# Patient Record
Sex: FEMALE | Race: BLACK OR AFRICAN AMERICAN | NOT HISPANIC OR LATINO | Employment: STUDENT
[De-identification: names, ages, dates, MRNs, and addresses within clinical notes are randomized per-mention and may not be internally consistent; named-entity substitution may affect disease eponyms.]

---
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
## 2022-04-26 ENCOUNTER — WALK IN (OUTPATIENT)
Dept: URGENT CARE | Age: 22
End: 2022-04-26
Attending: FAMILY MEDICINE

## 2022-04-26 VITALS
DIASTOLIC BLOOD PRESSURE: 75 MMHG | TEMPERATURE: 98.7 F | RESPIRATION RATE: 18 BRPM | SYSTOLIC BLOOD PRESSURE: 116 MMHG | HEIGHT: 68 IN | HEART RATE: 103 BPM | WEIGHT: 215 LBS | OXYGEN SATURATION: 99 % | BODY MASS INDEX: 32.58 KG/M2

## 2022-04-26 DIAGNOSIS — J02.9 SORE THROAT: Primary | ICD-10-CM

## 2022-04-26 DIAGNOSIS — U07.1 COVID-19 VIRUS INFECTION: ICD-10-CM

## 2022-04-26 LAB
INTERNAL PROCEDURAL CONTROLS ACCEPTABLE: YES
SARS-COV+SARS-COV-2 AG RESP QL IA.RAPID: DETECTED

## 2022-04-26 PROCEDURE — C9803 HOPD COVID-19 SPEC COLLECT: HCPCS

## 2022-04-26 PROCEDURE — 99203 OFFICE O/P NEW LOW 30 MIN: CPT

## 2022-04-26 PROCEDURE — 87426 SARSCOV CORONAVIRUS AG IA: CPT | Performed by: FAMILY MEDICINE

## 2022-04-26 ASSESSMENT — ENCOUNTER SYMPTOMS
SORE THROAT: 1
COUGH: 1

## 2022-04-26 ASSESSMENT — PAIN SCALES - GENERAL: PAINLEVEL: 6

## 2024-02-16 ENCOUNTER — HOSPITAL ENCOUNTER (EMERGENCY)
Age: 24
Discharge: PSYCHIATRIC OR SUBSTANCE USE FACILITY | End: 2024-02-16
Attending: EMERGENCY MEDICINE

## 2024-02-16 VITALS
DIASTOLIC BLOOD PRESSURE: 74 MMHG | HEART RATE: 53 BPM | HEIGHT: 68 IN | BODY MASS INDEX: 32.69 KG/M2 | OXYGEN SATURATION: 98 % | SYSTOLIC BLOOD PRESSURE: 107 MMHG | RESPIRATION RATE: 16 BRPM | TEMPERATURE: 99.1 F

## 2024-02-16 DIAGNOSIS — F32.2 CURRENT SEVERE EPISODE OF MAJOR DEPRESSIVE DISORDER WITHOUT PSYCHOTIC FEATURES, UNSPECIFIED WHETHER RECURRENT (CMD): Primary | ICD-10-CM

## 2024-02-16 PROBLEM — F33.2 MAJOR DEPRESSIVE DISORDER, RECURRENT SEVERE WITHOUT PSYCHOTIC FEATURES (HCC): Status: ACTIVE | Noted: 2024-02-16

## 2024-02-16 LAB
ALBUMIN SERPL-MCNC: 3.6 G/DL (ref 3.6–5.1)
ALBUMIN/GLOB SERPL: 0.8 {RATIO} (ref 1–2.4)
ALP SERPL-CCNC: 51 UNITS/L (ref 45–117)
ALT SERPL-CCNC: 14 UNITS/L
AMPHETAMINES UR QL SCN>500 NG/ML: NEGATIVE
ANION GAP SERPL CALC-SCNC: 8 MMOL/L (ref 7–19)
AST SERPL-CCNC: 16 UNITS/L
B-HCG UR QL: NEGATIVE
BARBITURATES UR QL SCN>200 NG/ML: NEGATIVE
BASOPHILS # BLD: 0 K/MCL (ref 0–0.3)
BASOPHILS NFR BLD: 1 %
BENZODIAZ UR QL SCN>200 NG/ML: NEGATIVE
BILIRUB SERPL-MCNC: 0.7 MG/DL (ref 0.2–1)
BUN SERPL-MCNC: 7 MG/DL (ref 6–20)
BUN/CREAT SERPL: 9 (ref 7–25)
BZE UR QL SCN>150 NG/ML: NEGATIVE
CALCIUM SERPL-MCNC: 9.6 MG/DL (ref 8.4–10.2)
CANNABINOIDS UR QL SCN>50 NG/ML: POSITIVE
CHLORIDE SERPL-SCNC: 105 MMOL/L (ref 97–110)
CO2 SERPL-SCNC: 26 MMOL/L (ref 21–32)
CREAT SERPL-MCNC: 0.76 MG/DL (ref 0.51–0.95)
DEPRECATED RDW RBC: 51.4 FL (ref 39–50)
EGFRCR SERPLBLD CKD-EPI 2021: >90 ML/MIN/{1.73_M2}
EOSINOPHIL # BLD: 0 K/MCL (ref 0–0.5)
EOSINOPHIL NFR BLD: 1 %
ERYTHROCYTE [DISTWIDTH] IN BLOOD: 16.5 % (ref 11–15)
ETHANOL SERPL-MCNC: NORMAL MG/DL
FASTING DURATION TIME PATIENT: ABNORMAL H
FENTANYL UR QL SCN: NEGATIVE
GLOBULIN SER-MCNC: 4.6 G/DL (ref 2–4)
GLUCOSE SERPL-MCNC: 103 MG/DL (ref 70–99)
HCG UR QL: NEGATIVE
HCT VFR BLD CALC: 38.5 % (ref 36–46.5)
HGB BLD-MCNC: 11.8 G/DL (ref 12–15.5)
IMM GRANULOCYTES # BLD AUTO: 0 K/MCL (ref 0–0.2)
IMM GRANULOCYTES # BLD: 0 %
INTERNAL PROCEDURAL CONTROLS ACCEPTABLE: YES
LYMPHOCYTES # BLD: 1.7 K/MCL (ref 1–4.8)
LYMPHOCYTES NFR BLD: 29 %
MCH RBC QN AUTO: 26 PG (ref 26–34)
MCHC RBC AUTO-ENTMCNC: 30.6 G/DL (ref 32–36.5)
MCV RBC AUTO: 84.8 FL (ref 78–100)
MONOCYTES # BLD: 0.5 K/MCL (ref 0.3–0.9)
MONOCYTES NFR BLD: 8 %
NEUTROPHILS # BLD: 3.5 K/MCL (ref 1.8–7.7)
NEUTROPHILS NFR BLD: 61 %
NRBC BLD MANUAL-RTO: 0 /100 WBC
OPIATES UR QL SCN>300 NG/ML: NEGATIVE
PCP UR QL SCN>25 NG/ML: NEGATIVE
PLATELET # BLD AUTO: 335 K/MCL (ref 140–450)
POTASSIUM SERPL-SCNC: 3.1 MMOL/L (ref 3.4–5.1)
PROT SERPL-MCNC: 8.2 G/DL (ref 6.4–8.2)
RBC # BLD: 4.54 MIL/MCL (ref 4–5.2)
SARS-COV-2 RNA RESP QL NAA+PROBE: NOT DETECTED
SERVICE CMNT-IMP: NORMAL
SERVICE CMNT-IMP: NORMAL
SODIUM SERPL-SCNC: 136 MMOL/L (ref 135–145)
WBC # BLD: 5.8 K/MCL (ref 4.2–11)

## 2024-02-16 PROCEDURE — 80053 COMPREHEN METABOLIC PANEL: CPT | Performed by: EMERGENCY MEDICINE

## 2024-02-16 PROCEDURE — 99284 EMERGENCY DEPT VISIT MOD MDM: CPT

## 2024-02-16 PROCEDURE — 82077 ASSAY SPEC XCP UR&BREATH IA: CPT | Performed by: EMERGENCY MEDICINE

## 2024-02-16 PROCEDURE — 84703 CHORIONIC GONADOTROPIN ASSAY: CPT

## 2024-02-16 PROCEDURE — 36415 COLL VENOUS BLD VENIPUNCTURE: CPT

## 2024-02-16 PROCEDURE — 90839 PSYTX CRISIS INITIAL 60 MIN: CPT

## 2024-02-16 PROCEDURE — 81025 URINE PREGNANCY TEST: CPT | Performed by: EMERGENCY MEDICINE

## 2024-02-16 PROCEDURE — 87635 SARS-COV-2 COVID-19 AMP PRB: CPT | Performed by: EMERGENCY MEDICINE

## 2024-02-16 PROCEDURE — 10002803 HB RX 637: Performed by: EMERGENCY MEDICINE

## 2024-02-16 PROCEDURE — 80307 DRUG TEST PRSMV CHEM ANLYZR: CPT | Performed by: EMERGENCY MEDICINE

## 2024-02-16 PROCEDURE — 85025 COMPLETE CBC W/AUTO DIFF WBC: CPT | Performed by: EMERGENCY MEDICINE

## 2024-02-16 RX ORDER — POTASSIUM CHLORIDE 20 MEQ/1
40 TABLET, EXTENDED RELEASE ORAL ONCE
Status: COMPLETED | OUTPATIENT
Start: 2024-02-16 | End: 2024-02-16

## 2024-02-16 RX ORDER — DIAZEPAM 5 MG/1
5 TABLET ORAL ONCE
Status: COMPLETED | OUTPATIENT
Start: 2024-02-16 | End: 2024-02-16

## 2024-02-16 RX ADMIN — POTASSIUM CHLORIDE 40 MEQ: 1500 TABLET, EXTENDED RELEASE ORAL at 19:01

## 2024-02-16 RX ADMIN — DIAZEPAM 5 MG: 5 TABLET ORAL at 16:50

## 2024-02-16 SDOH — ECONOMIC STABILITY: HOUSING INSECURITY: WHAT IS YOUR LIVING SITUATION TODAY?: I HAVE A STEADY PLACE TO LIVE

## 2024-02-16 SDOH — SOCIAL STABILITY: SOCIAL NETWORK
HOW OFTEN DO YOU SEE OR TALK TO PEOPLE THAT YOU CARE ABOUT AND FEEL CLOSE TO? (FOR EXAMPLE: TALKING TO FRIENDS ON THE PHONE, VISITING FRIENDS OR FAMILY, GOING TO CHURCH OR CLUB MEETINGS): 5 OR MORE TIMES A WEEK

## 2024-02-16 SDOH — ECONOMIC STABILITY: HOUSING INSECURITY: DO YOU HAVE PROBLEMS WITH ANY OF THE FOLLOWING?: NONE OF THE ABOVE

## 2024-02-16 SDOH — ECONOMIC STABILITY: FOOD INSECURITY: WITHIN THE PAST 12 MONTHS, THE FOOD YOU BOUGHT JUST DIDN'T LAST AND YOU DIDN'T HAVE MONEY TO GET MORE.: NEVER TRUE

## 2024-02-16 SDOH — ECONOMIC STABILITY: GENERAL

## 2024-02-16 SDOH — ECONOMIC STABILITY: TRANSPORTATION INSECURITY
IN THE PAST 12 MONTHS, HAS LACK OF RELIABLE TRANSPORTATION KEPT YOU FROM MEDICAL APPOINTMENTS, MEETINGS, WORK OR FROM GETTING THINGS NEEDED FOR DAILY LIVING?: NO

## 2024-02-16 ASSESSMENT — LIFESTYLE VARIABLES
HOW OFTEN DO YOU HAVE 6 OR MORE DRINKS ON ONE OCCASION: NEVER
HOW OFTEN DO YOU HAVE A DRINK CONTAINING ALCOHOL: NEVER
HOW MANY STANDARD DRINKS CONTAINING ALCOHOL DO YOU HAVE ON A TYPICAL DAY: 0,1 OR 2
ALCOHOL_USE_STATUS: NO OR LOW RISK WITH VALIDATED TOOL
ALCOHOL_USE: DENIES
AUDIT-C TOTAL SCORE: 0

## 2024-02-16 ASSESSMENT — COGNITIVE AND FUNCTIONAL STATUS - GENERAL
BEHAVIOR: APPROPRIATE TO SITUATION;CRYING
MEMORY: INTACT
MOTOR_BEHAVIOR-RETARDATION_CALCULATED: CALM AND PURPOSEFUL
ATTENTION_CALCULATED: MAINTAINS ATTENTION
AFFECT: APPROPRIATE TO SITUATION;SAD
SPEECH: CLEAR/UNDERSTANDABLE
LEVEL_OF_CONSCIOUSNESS_CALCULATED: ALERT
PERCEPTUAL_MISINTERPRETATIONS_HALLUCINATIONS: CLEAR REALITY BASED PERCEPTIONS
MOTOR_BEHAVIOR-AGITATION_CALCULATED: CALM AND PURPOSEFUL
MOOD: ANXIOUS;DEPRESSED
ORIENTATION: ORIENTED TO PERSON;ORIENTED TO PLACE;ORIENTED TO TIME

## 2024-02-16 ASSESSMENT — PAIN SCALES - GENERAL: PAINLEVEL_OUTOF10: 0

## 2024-03-25 PROBLEM — F43.10 PTSD (POST-TRAUMATIC STRESS DISORDER): Status: ACTIVE | Noted: 2024-03-25

## 2024-03-25 PROBLEM — F12.90 CANNABIS USE DISORDER: Status: ACTIVE | Noted: 2024-03-25

## 2024-03-25 PROBLEM — F41.1 GAD (GENERALIZED ANXIETY DISORDER): Status: ACTIVE | Noted: 2024-03-25

## 2024-05-30 ENCOUNTER — NURSE TRIAGE (OUTPATIENT)
Dept: FAMILY MEDICINE CLINIC | Facility: CLINIC | Age: 24
End: 2024-05-30

## 2024-05-31 ENCOUNTER — TELEMEDICINE (OUTPATIENT)
Dept: FAMILY MEDICINE CLINIC | Facility: CLINIC | Age: 24
End: 2024-05-31

## 2024-05-31 DIAGNOSIS — Z70.8 COUNSELING FOR SEXUALLY TRANSMITTED DISEASE: ICD-10-CM

## 2024-05-31 DIAGNOSIS — N89.8 VAGINAL DISCHARGE: ICD-10-CM

## 2024-05-31 DIAGNOSIS — Z20.2 EXPOSURE TO CHLAMYDIA: ICD-10-CM

## 2024-05-31 RX ORDER — DOXYCYCLINE HYCLATE 100 MG
100 TABLET ORAL 2 TIMES DAILY
Qty: 14 TABLET | Refills: 0 | Status: SHIPPED | OUTPATIENT
Start: 2024-05-31 | End: 2024-06-07

## 2024-05-31 NOTE — PROGRESS NOTES
This is a telemedicine visit with live, interactive video and audio.     Patient understands and accepts financial responsibility for any deductible, co-insurance and/or co-pays associated with this service.    SUBJECTIVE    24-year-old female calling to discuss exposure to chlamydia.  States that when her and her partner started dating, he got tested and was positive for chlamydia which prompted her to get tested at that time and was positive as well.  They were both treated with doxycycline however did not complete the course of antibiotics and missed a couple of days.  In general both felt well afterwards.    Her boyfriend went for an office visit around 1.5 weeks ago and had an STD panel that came back positive for chlamydia.  Patient states gonorrhea was negative however will double check with boyfriend.  For the past week patient has been noticing vaginal discharge with a foul odor.  Denies fever, chills.      HISTORY:  No past medical history on file.   No past surgical history on file.   Family History   Problem Relation Age of Onset    Bipolar Disorder Son       Social History     Socioeconomic History    Marital status: Single   Tobacco Use    Smoking status: Never    Smokeless tobacco: Never   Vaping Use    Vaping status: Every Day    Substances: Nicotine, THC    Devices: Disposable   Substance and Sexual Activity    Alcohol use: Yes     Alcohol/week: 2.0 standard drinks of alcohol     Types: 2 Standard drinks or equivalent per week     Comment: social drinker. Last drink was 2- had 2 mixed drinks/    Drug use: Yes     Types: Cannabis     Social Determinants of Health     Financial Resource Strain: Low Risk  (2/16/2024)    [facility]    Financial Resource Strain     In the past year, have you or any family members you live with been unable to get any of the following when it was really needed? Check all that apply.: None   Food Insecurity: Low Risk   (2/16/2024)    [facility], [facility]    Food Insecurity     Within the past 12 months, you worried that your food would run out before you got money to buy more.  : Never true     Within the past 12 months, the food you bought just didn't last and you didn't have money to get more. : Never true   Transportation Needs: Not At Risk (2/16/2024)    [facility], [facility]    Transportation Needs     In the past 12 months, has lack of reliable transportation kept you from medical appointments, meetings, work or from getting things needed for daily living? : No   Social Connections: Low Risk  (2/16/2024)    Received from [facility], [facility]    Social Connections     How often do you see or talk to people that you care about and feel close to? (For example: talking to friends on the phone, visiting friends or family, going to [employer] or club meetings): 5 or more times a week        No Known Allergies   Current Outpatient Medications   Medication Sig Dispense Refill    Doxycycline Hyclate 100 MG Oral Tab Take 1 tablet (100 mg total) by mouth 2 (two) times daily for 7 days. 14 tablet 0    traZODone 100 MG Oral Tab Take 1 tablet (100 mg total) by mouth nightly as needed for Sleep (sleep/insomnia). 30 tablet 0    FLUoxetine 20 MG Oral Cap Take 1 capsule (20 mg total) by mouth daily. 30 capsule 0    hydrOXYzine 25 MG Oral Tab Take 1 tablet (25 mg total) by mouth daily as needed for Anxiety. 30 tablet 0       OBJECTIVE  Physical Exam:   alert, appears stated age, cooperative, and no distress, Speaking in full sentences comfortably, and Normal work of breathing    ASSESSMENT & PLAN    1. Exposure to chlamydia  Patient denies any chance of pregnancy.  -Advised patient to verify that boyfriend's gonorrhea test is negative.  -Advised about screening for other STDs  -Counseled that current symptoms may be in relation to other  vaginosis/vaginitis symptoms  -     Doxycycline Hyclate; Take 1 tablet (100 mg total) by mouth 2 (two) times daily for 7 days.  Dispense: 14 tablet; Refill: 0  2. Vaginal discharge  -Counseled that current symptoms may be in relation to other vaginosis/vaginitis symptoms such as BV etc.  -If symptoms continue, to follow-up next week for vaginitis/vaginosis swab.  Also due for Pap smear.  3. Counseling for sexually transmitted disease        Return in about 1 week (around 6/7/2024), or if symptoms worsen or fail to improve.    Benjamin Light MD

## 2024-05-31 NOTE — TELEPHONE ENCOUNTER
RN spoke with patient.     Patient is having odor, discharge, and vaginal itching. Symptoms have been on and off for a while.     Patient recently exposed to chlamydia. Patient states boyfriend and her were treated in the past for infection. Patient says that they both missed a few days of medication for previous infection.       Patient's partner tested positive a week and a half ago.       Patient has history of genital sores. Denies new sores.     Patient scheduled to discuss with Dr. Light today.         Reason for Disposition   Patient is worried about a sexually transmitted disease (STD)    Protocols used: STD Exposure and Prevention-A-[place]

## 2024-08-14 ENCOUNTER — TELEPHONE (OUTPATIENT)
Dept: FAMILY MEDICINE CLINIC | Facility: CLINIC | Age: 24
End: 2024-08-14

## 2024-08-14 ENCOUNTER — APPOINTMENT (OUTPATIENT)
Dept: GENERAL RADIOLOGY | Age: 24
End: 2024-08-14
Attending: NURSE PRACTITIONER
Payer: COMMERCIAL

## 2024-08-14 ENCOUNTER — HOSPITAL ENCOUNTER (OUTPATIENT)
Age: 24
Discharge: HOME | End: 2024-08-14
Payer: COMMERCIAL

## 2024-08-14 VITALS
RESPIRATION RATE: 18 BRPM | HEART RATE: 95 BPM | SYSTOLIC BLOOD PRESSURE: 126 MMHG | OXYGEN SATURATION: 100 % | TEMPERATURE: 97 F | DIASTOLIC BLOOD PRESSURE: 83 MMHG

## 2024-08-14 DIAGNOSIS — E87.6 HYPOKALEMIA: ICD-10-CM

## 2024-08-14 DIAGNOSIS — R42 DIZZINESS: Primary | ICD-10-CM

## 2024-08-14 DIAGNOSIS — B34.9 VIRAL ILLNESS: ICD-10-CM

## 2024-08-14 LAB
#MXD IC: 0.6 X10ˆ3/UL (ref 0.1–1)
#MXD IC: 0.7 X10ˆ3/UL (ref 0.1–1)
ATRIAL RATE: 88 BPM
BUN BLD-MCNC: 5 MG/DL (ref 7–18)
BUN BLD-MCNC: 6 MG/DL (ref 7–18)
CHLORIDE BLD-SCNC: 100 MMOL/L (ref 98–112)
CHLORIDE BLD-SCNC: 99 MMOL/L (ref 98–112)
CO2 BLD-SCNC: 26 MMOL/L (ref 21–32)
CO2 BLD-SCNC: 26 MMOL/L (ref 21–32)
CREAT BLD-MCNC: 0.7 MG/DL
CREAT BLD-MCNC: 0.7 MG/DL
EGFRCR SERPLBLD CKD-EPI 2021: 124 ML/MIN/1.73M2 (ref 60–?)
EGFRCR SERPLBLD CKD-EPI 2021: 124 ML/MIN/1.73M2 (ref 60–?)
GLUCOSE BLD-MCNC: 100 MG/DL (ref 70–99)
GLUCOSE BLD-MCNC: 100 MG/DL (ref 70–99)
HCT VFR BLD AUTO: 37.4 %
HCT VFR BLD AUTO: 37.6 %
HCT VFR BLD CALC: 40 %
HCT VFR BLD CALC: 40 %
HGB BLD-MCNC: 11.4 G/DL
HGB BLD-MCNC: 11.4 G/DL
ISTAT IONIZED CALCIUM FOR CHEM 8: 1.1 MMOL/L (ref 1.12–1.32)
ISTAT IONIZED CALCIUM FOR CHEM 8: 1.15 MMOL/L (ref 1.12–1.32)
LYMPHOCYTES # BLD AUTO: 3 X10ˆ3/UL (ref 1–4)
LYMPHOCYTES # BLD AUTO: 3.1 X10ˆ3/UL (ref 1–4)
LYMPHOCYTES NFR BLD AUTO: 66.4 %
LYMPHOCYTES NFR BLD AUTO: 67.7 %
MCH RBC QN AUTO: 26.1 PG (ref 26–34)
MCH RBC QN AUTO: 26.3 PG (ref 26–34)
MCHC RBC AUTO-ENTMCNC: 30.3 G/DL (ref 31–37)
MCHC RBC AUTO-ENTMCNC: 30.5 G/DL (ref 31–37)
MCV RBC AUTO: 85.8 FL (ref 80–100)
MCV RBC AUTO: 86.6 FL (ref 80–100)
MIXED CELL %: 12.6 %
MIXED CELL %: 15.7 %
NEUTROPHILS # BLD AUTO: 0.8 X10ˆ3/UL (ref 1.5–7.7)
NEUTROPHILS # BLD AUTO: 0.9 X10ˆ3/UL (ref 1.5–7.7)
NEUTROPHILS NFR BLD AUTO: 17.9 %
NEUTROPHILS NFR BLD AUTO: 19.7 %
P AXIS: 52 DEGREES
P-R INTERVAL: 148 MS
PLATELET # BLD AUTO: 211 X10ˆ3/UL (ref 150–450)
PLATELET # BLD AUTO: 215 X10ˆ3/UL (ref 150–450)
POCT MONO: NEGATIVE
POTASSIUM BLD-SCNC: 2.9 MMOL/L (ref 3.6–5.1)
POTASSIUM BLD-SCNC: 3.2 MMOL/L (ref 3.6–5.1)
Q-T INTERVAL: 364 MS
QRS DURATION: 80 MS
QTC CALCULATION (BEZET): 440 MS
R AXIS: 47 DEGREES
RBC # BLD AUTO: 4.34 X10ˆ6/UL
RBC # BLD AUTO: 4.36 X10ˆ6/UL
S PYO AG THROAT QL: NEGATIVE
SODIUM BLD-SCNC: 138 MMOL/L (ref 136–145)
SODIUM BLD-SCNC: 138 MMOL/L (ref 136–145)
T AXIS: 21 DEGREES
TROPONIN I BLD-MCNC: <0.02 NG/ML
VENTRICULAR RATE: 88 BPM
WBC # BLD AUTO: 4.5 X10ˆ3/UL (ref 4–11)
WBC # BLD AUTO: 4.6 X10ˆ3/UL (ref 4–11)

## 2024-08-14 PROCEDURE — 84484 ASSAY OF TROPONIN QUANT: CPT | Performed by: NURSE PRACTITIONER

## 2024-08-14 PROCEDURE — 86308 HETEROPHILE ANTIBODY SCREEN: CPT | Performed by: NURSE PRACTITIONER

## 2024-08-14 PROCEDURE — 80047 BASIC METABLC PNL IONIZED CA: CPT | Performed by: NURSE PRACTITIONER

## 2024-08-14 PROCEDURE — 87880 STREP A ASSAY W/OPTIC: CPT | Performed by: NURSE PRACTITIONER

## 2024-08-14 PROCEDURE — 85025 COMPLETE CBC W/AUTO DIFF WBC: CPT | Performed by: NURSE PRACTITIONER

## 2024-08-14 PROCEDURE — 71046 X-RAY EXAM CHEST 2 VIEWS: CPT | Performed by: NURSE PRACTITIONER

## 2024-08-14 PROCEDURE — 99214 OFFICE O/P EST MOD 30 MIN: CPT | Performed by: NURSE PRACTITIONER

## 2024-08-14 PROCEDURE — 93000 ELECTROCARDIOGRAM COMPLETE: CPT | Performed by: NURSE PRACTITIONER

## 2024-08-14 RX ORDER — POTASSIUM CHLORIDE 1500 MG/1
40 TABLET, EXTENDED RELEASE ORAL DAILY
Qty: 20 TABLET | Refills: 0 | Status: SHIPPED | OUTPATIENT
Start: 2024-08-14 | End: 2024-08-24

## 2024-08-14 RX ORDER — POTASSIUM CHLORIDE 20 MEQ/1
40 TABLET, EXTENDED RELEASE ORAL ONCE
Status: COMPLETED | OUTPATIENT
Start: 2024-08-14 | End: 2024-08-14

## 2024-08-14 NOTE — DISCHARGE INSTRUCTIONS
As discussed, potassium low in immediate care.  3.2.  I have prescribed potassium for you to take daily for the next 10 days.  Please follow-up with your primary care doctor promptly for reevaluation of potassium level.  I will also reach out to him to let him know of your visit to immediate care clinic today.    You are negative for strep.  You are negative for mono.    Your chest x-ray is negative for any acute disease in the chest.  EKG within normal limits.    CBC relatively within normal limits.    Please drink plenty of water and stay well-hydrated.  Change positions slowly.    If you have any chest pain, dizziness, [place] palpitations, shortness of breath, please go to ER.

## 2024-08-14 NOTE — TELEPHONE ENCOUNTER
RN spoke with patient.     Patient sent iSquare message with several symptoms.     Symptoms started approx 3 weeks ago.     Patient complains of \"extreme fatigue\" patient also complains of \"swollen bumps\". Patient feels \"weird spots in her neck\"    Patient has had episodes of dizziness. Was at work on Thursday and felt like \"her brain was being zapped for a minute\" and she vomiting after that\"     Patient had stomach pain last week. Mostly on the left side.    Today patient is feeling sore, itchy, and sweaty.     Patient denies chest pain or shortness of breath at this time. Patient says that \"her collar bone is sore\"     Patient advised to proceed to immediate care for assessment and evaluation of symptoms.     Patient voiced understanding and agreement with the plan.

## 2024-08-14 NOTE — ED PROVIDER NOTES
Patient Seen in: [facility]      History     Chief Complaint   Patient presents with    Fatigue     Stated Complaint: Sore Throat    Subjective: This is a 24-year-old female, past medical history of anxiety/depression and current vape use, presents to immediate care with multiple medical complaints that been ongoing for the past 2 to 4 weeks.  Patient reports subjective fever, chills, body aches, headache.  She also reports sore throat and lymph node swelling.  Reports emesis and diarrhea on Friday.  Does describe biliary emesis without blood.  She also reports these intermittent spells of dizziness where she almost passes out.  Denies aggravating or alleviating factors.  Sent home from work recently due to dizzy spell.  She took several COVID-19 test prior to arrival which were negative.  Patient afebrile on arrival.  No current cough or congestion, however positive sore throat and lymph node swelling.  Positive chest wall tenderness and tightness without chest pain.  No current dizziness, lightheadedness, palpitations, shortness of breath.  Denies recent travel.  No prolonged immobilization.  She is not on birth control.  No personal or family history of DVT/PE.  No family cardiac history of cardiac event before the age of 50.  Patient is well-appearing.  Speaking in complete full sentences without difficulty.  AOx4.  HPI        Objective:   History reviewed. No pertinent past medical history.           History reviewed. No pertinent surgical history.             Social History     Socioeconomic History    Marital status: Single   Tobacco Use    Smoking status: Never    Smokeless tobacco: Never   Vaping Use    Vaping status: Every Day    Substances: Nicotine, THC    Devices: Disposable   Substance and Sexual Activity    Alcohol use: Yes     Alcohol/week: 2.0 standard drinks of alcohol     Types: 2 Standard drinks or equivalent per week     Comment: social drinker. Last drink was 2- had 2  mixed drinks/    Drug use: Yes     Types: Cannabis     Social Determinants of Health     Financial Resource Strain: Low Risk  (2/16/2024)    [facility], [facility]    Financial Resource Strain     In the past year, have you or any family members you live with been unable to get any of the following when it was really needed? Check all that apply.: None   Food Insecurity: Low Risk  (2/16/2024)    [facility], [facility]    Food Insecurity     Within the past 12 months, you worried that your food would run out before you got money to buy more.  : Never true     Within the past 12 months, the food you bought just didn't last and you didn't have money to get more. : Never true   Transportation Needs: Not At Risk (2/16/2024)    [facility], [facility]    Transportation Needs     In the past 12 months, has lack of reliable transportation kept you from medical appointments, meetings, work or from getting things needed for daily living? : No   Social Connections: Low Risk  (2/16/2024)    [facility], [facility]    Social Connections     How often do you see or talk to people that you care about and feel close to? (For example: talking to friends on the phone, visiting friends or family, going to [employer] or club meetings): 5 or more times a week              Review of Systems    Positive for stated Chief Complaint: Fatigue    Other systems are as noted in HPI.  Constitutional and vital signs reviewed.      All other systems reviewed and negative except as noted above.    Physical Exam     ED Triage Vitals [08/14/24 1356]   /83   Pulse 95   Resp 18   Temp 97.2 °F (36.2 °C)   Temp src Temporal   SpO2 100 %   O2 Device None (Room air)       Current Vitals:   Vital Signs  BP: 126/83  Pulse: 95  Resp: 18  Temp: 97.2 °F (36.2 °C)  Temp src: Temporal    Oxygen Therapy  SpO2: 100  %  O2 Device: None (Room air)            Physical Exam          ED Course     Labs Reviewed   POCT CBC - Abnormal; Notable for the following components:       Result Value    HGB IC 11.4 (*)     MCHC IC 30.5 (*)     # Neutrophil 0.9 (*)     All other components within normal limits   POCT CBC - Abnormal; Notable for the following components:    HGB IC 11.4 (*)     MCHC IC 30.3 (*)     # Neutrophil 0.8 (*)     All other components within normal limits   POCT ISTAT CHEM8 CARTRIDGE - Abnormal; Notable for the following components:    ISTAT BUN 5 (*)     ISTAT Potassium 2.9 (*)     ISTAT Ionized Calcium 1.10 (*)     ISTAT Glucose 100 (*)     All other components within normal limits   POCT ISTAT CHEM8 CARTRIDGE - Abnormal; Notable for the following components:    ISTAT BUN 6 (*)     ISTAT Potassium 3.2 (*)     ISTAT Glucose 100 (*)     All other components within normal limits   POCT MONO TEST - Normal   POCT RAPID STREP - Normal   ISTAT TROPONIN - Normal     EKG    Rate, intervals and axes as noted on EKG Report.  Rate:   Rhythm: Sinus Rhythm  Reading: Normal sinus rhythm.  No ST elevation.  Tall peaked QRS, narrow complex.  No previous EKGs for comparison.            XR CHEST PA + LAT CHEST (CPT=71046)    Result Date: 8/14/2024  CONCLUSION: No radiographic evidence of acute cardiopulmonary abnormality.    elm-remote    Dictated by (CST): Tevin Barajas MD on 8/14/2024 at 3:11 PM     Finalized by (CST): Tevin Barajas MD on 8/14/2024 at 3:12 PM                       MDM      Differentials considered include: Anemia, pneumonia, strep pharyngitis, mononucleosis, metabolic abnormality, arrhythmia.     Patient has had symptoms for greater than 3 weeks.  Dizziness without aggravating relieving factors.  No headache, vision changes, nausea, vomiting with dizziness.  However, did have episode of nausea, vomiting, diarrhea on Friday which is since resolved.    Patient tested her cell for COVID-19 several times and was negative.   Due to length and duration of symptoms, I do not believe that any retesting is warranted.    Patient negative for strep pharyngitis.  Airway patent.  Uvula midline and normal size.  Mucous membranes moist.  No intraoral lesions or petechiae.  Mild cervical lymphadenopathy.  Patient tolerating his secretions well without difficulty.    Patient reports left upper quadrant feeling \"bouncy\".  There is no hepatomegaly or splenomegaly on exam.  Nontender on exam.  Patient negative for infectious mononucleosis.    CBC with mildly decreased hemoglobin.  No leukocytosis.    Patient's chemistry with hypokalemia of 2.9 at first, redrawn for affirmation, 3.2 at second redraw.  Patient does verbalize during a recent mental health crisis she was told she had hypokalemia and was given potassium.  However, she cannot recall level, unable to access chart as this was at a different hospital/organization.  Patient cannot access her MyChart either.  Her previous potassium was normal at 3.5 in February of this year.  She does report dizziness which would coincide with hypokalemia as well as fatigue.  Positive abdominal cramping with nausea, vomiting.  Denies any muscle cramps or spasms.    She is not on any potassium depleting medication.  She was given one-time dose of 40 mEq in immediate care, will discharge home with 10-day supply.  Would like patient to follow-up promptly with PCP for reevaluation.    Chest x-ray negative, no acute disease seen in the chest on independent interpretation of chest x-ray.  No pneumonia or bronchitis.  Patient was without cough, congestion, runny nose.    EKG within normal limits.  Normal sinus rhythm.  No previous EKG for comparison.  There is no prolonged QT on EKG.  No ST depression.  Unclear cause for patient's hypokalemia.    Did discuss all findings with patient.  Unclear cause of symptoms.    She also reports full body itching without known causative agent and organism.  She denies any new soaps,  lotions, detergents etc.  Will send her to dermatology.  Encourage patient to keep a log to see if there are any, denominator's when she feels whole body itching.  No angioedema.    Did discuss with supervising physician, Dr. Vargas who agrees with workup, plan of care, strict PCP follow-up with strict ER precautions.    This provider will reach out to patient's primary care doctor to inform him of her visit to immediate care and abnormal lab values.                                   Medical Decision Making  Amount and/or Complexity of Data Reviewed  Labs: ordered.  Radiology: ordered and independent interpretation performed.  ECG/medicine tests: ordered and independent interpretation performed.        Disposition and Plan     Clinical Impression:  1. Dizziness    2. Hypokalemia    3. Viral illness         Disposition:  Discharge  8/14/2024  3:50 pm    Follow-up:  Benjamin Light MD  [address] [facility]  [address]  [place] [address]  [phone]    Schedule an appointment as soon as possible for a visit in 7 days      [facility] [place] [address]  [facility] [id number] [phone]  Schedule an appointment as soon as possible for a visit             Medications Prescribed:  Discharge Medication List as of 8/14/2024  3:57 PM        START taking these medications    Details   Potassium Chloride ER 20 MEQ Oral Tab CR Take 40 mEq by mouth daily for 10 days., Normal, Disp-20 tablet, R-0

## 2024-08-14 NOTE — ED INITIAL ASSESSMENT (HPI)
Last few weeks, intermittent chills, bodyaches, dizziness, emesis x1 on Friday, fatigue. Patient reports being neg for covid on Thursday and Saturday. Intermittent itchiness, swollen neck lymph nodes. Recently started prescription of vitamin D last Thursday.

## 2024-08-16 ENCOUNTER — TELEPHONE (OUTPATIENT)
Dept: FAMILY MEDICINE CLINIC | Facility: CLINIC | Age: 24
End: 2024-08-16

## 2024-08-16 NOTE — TELEPHONE ENCOUNTER
Agree with evaluation in-person as scheduled. Please recommend that she start potassium right away as her levels were low in the IC. Thanks.

## 2024-08-16 NOTE — TELEPHONE ENCOUNTER
RN spoke with patient.       Patient says she went to immediate care for evaluation of symptoms.     Patient says she has concern for lupus, lymphoma, or HIV.     Patient says she had a cousin with lupus. Patient unsure of HIV exposure, says her partner was \"hypersexual\" but testing was negative.     Patient says she is having decreased appetite, muscle twitching, and early fullness when eating.     Pt was dx with low K+ in [facility]- was given outpatient rx for supplementation. Has not picked up rx yet.     Advised patient to proceed to ED for evaluation of symptoms due to previous low potassium.     Patient scheduled for follow up on 8/20/24 with Dr. Light to discuss all symptoms.

## 2024-08-20 ENCOUNTER — OFFICE VISIT (OUTPATIENT)
Dept: FAMILY MEDICINE CLINIC | Facility: CLINIC | Age: 24
End: 2024-08-20
Payer: COMMERCIAL

## 2024-08-20 VITALS
WEIGHT: 205.19 LBS | TEMPERATURE: 98 F | HEIGHT: 68 IN | HEART RATE: 112 BPM | SYSTOLIC BLOOD PRESSURE: 110 MMHG | BODY MASS INDEX: 31.1 KG/M2 | DIASTOLIC BLOOD PRESSURE: 80 MMHG | OXYGEN SATURATION: 100 %

## 2024-08-20 DIAGNOSIS — Z86.19 HISTORY OF CHLAMYDIA: ICD-10-CM

## 2024-08-20 DIAGNOSIS — Z11.3 SCREENING FOR STD (SEXUALLY TRANSMITTED DISEASE): ICD-10-CM

## 2024-08-20 DIAGNOSIS — E66.9 OBESITY, CLASS I, BMI 30-34.9: ICD-10-CM

## 2024-08-20 DIAGNOSIS — E87.6 HYPOKALEMIA: Primary | ICD-10-CM

## 2024-08-20 DIAGNOSIS — R68.81 EARLY SATIETY: ICD-10-CM

## 2024-08-20 DIAGNOSIS — R11.2 NAUSEA AND VOMITING, UNSPECIFIED VOMITING TYPE: ICD-10-CM

## 2024-08-20 DIAGNOSIS — R53.83 OTHER FATIGUE: ICD-10-CM

## 2024-08-20 DIAGNOSIS — R42 DIZZINESS: ICD-10-CM

## 2024-08-20 DIAGNOSIS — D64.9 MILD ANEMIA: ICD-10-CM

## 2024-08-20 LAB
APPEARANCE: CLEAR
BILIRUBIN: NEGATIVE
CONTROL LINE PRESENT WITH A CLEAR BACKGROUND (YES/NO): YES YES/NO
GLUCOSE (URINE DIPSTICK): NEGATIVE MG/DL
KIT LOT #: NORMAL NUMERIC
LEUKOCYTES: NEGATIVE
MULTISTIX LOT#: NORMAL NUMERIC
NITRITE, URINE: NEGATIVE
OCCULT BLOOD: NEGATIVE
PH, URINE: 5.5 (ref 4.5–8)
PREGNANCY TEST, URINE: NEGATIVE
SPECIFIC GRAVITY: 1.03 (ref 1–1.03)
URINE-COLOR: YELLOW
UROBILINOGEN,SEMI-QN: 0.2 MG/DL (ref 0–1.9)

## 2024-08-20 PROCEDURE — 87491 CHLMYD TRACH DNA AMP PROBE: CPT | Performed by: STUDENT IN AN ORGANIZED HEALTH CARE EDUCATION/TRAINING PROGRAM

## 2024-08-20 PROCEDURE — 99215 OFFICE O/P EST HI 40 MIN: CPT | Performed by: STUDENT IN AN ORGANIZED HEALTH CARE EDUCATION/TRAINING PROGRAM

## 2024-08-20 PROCEDURE — 3079F DIAST BP 80-89 MM HG: CPT | Performed by: STUDENT IN AN ORGANIZED HEALTH CARE EDUCATION/TRAINING PROGRAM

## 2024-08-20 PROCEDURE — 81003 URINALYSIS AUTO W/O SCOPE: CPT | Performed by: STUDENT IN AN ORGANIZED HEALTH CARE EDUCATION/TRAINING PROGRAM

## 2024-08-20 PROCEDURE — 87086 URINE CULTURE/COLONY COUNT: CPT | Performed by: STUDENT IN AN ORGANIZED HEALTH CARE EDUCATION/TRAINING PROGRAM

## 2024-08-20 PROCEDURE — 87591 N.GONORRHOEAE DNA AMP PROB: CPT | Performed by: STUDENT IN AN ORGANIZED HEALTH CARE EDUCATION/TRAINING PROGRAM

## 2024-08-20 PROCEDURE — 3074F SYST BP LT 130 MM HG: CPT | Performed by: STUDENT IN AN ORGANIZED HEALTH CARE EDUCATION/TRAINING PROGRAM

## 2024-08-20 PROCEDURE — 3008F BODY MASS INDEX DOCD: CPT | Performed by: STUDENT IN AN ORGANIZED HEALTH CARE EDUCATION/TRAINING PROGRAM

## 2024-08-20 PROCEDURE — 81025 URINE PREGNANCY TEST: CPT | Performed by: STUDENT IN AN ORGANIZED HEALTH CARE EDUCATION/TRAINING PROGRAM

## 2024-08-20 RX ORDER — HYDROXYZINE HYDROCHLORIDE 10 MG/1
TABLET, FILM COATED ORAL
COMMUNITY
Start: 2024-04-17

## 2024-08-20 NOTE — PROGRESS NOTES
Subjective:   Raheem LudwigMirianEarlene is a 24 year old female who presents for Urgent Care F/u     24-year-old female coming in for follow-up after immediate care visit on 8/14/2024.  Mentions that 2 weeks ago had subjective fever, chills and bodyaches as well as sensation of swollen lymph nodes in her neck.  Tested for COVID and was negative.  On 8/7/2020 -8/9/2024 had 3 episodes of vomiting and mentions third one being bilious.  Also mentioned intermittent episodes of sensation of dizziness.  She is able to hydrate well however mentions not eating as she normally does and feeling satiety after mild eating.  Occasionally gets right and left abdominal midaxillary line discomfort that would last a few seconds.  LMP on 8/12/2024.  During immediate care visit was noted to be hypokalemic and was given supplementation that she started on Friday.  Continues to feel mild fatigue at this time.  At this time denies fever, chills, dysuria, hematuria.    When questioned about history of STDs, mentions having chlamydia last year that was treated.  Mentions having Pap smear 1 year ago in [place] (states was WNL).  She is unsure about HPV vaccination however will check records.    History/Other:    Chief Complaint Reviewed and Verified  No Further Nursing Notes to   Review  Tobacco Reviewed  Allergies Reviewed  Medical History Reviewed    Surgical History Reviewed  Family History Reviewed  Social History   Reviewed         Tobacco:  Social History     Tobacco Use   Smoking Status Never   Smokeless Tobacco Never     E-Cigarettes/Vaping       Questions Responses    E-Cigarette Use Current Every Day User    Counseling Given No    Cartridges/Day every day - nicotine and THC  2 puffs every day to every other day.  Last time used was 2-          E-Cigarette/Vaping Substances       Questions Responses    Nicotine Yes    THC Yes    CBD No          E-Cigarette/Vaping Devices       Questions Responses    Disposable  Yes           Tobacco cessation counseling for <3 minutes.      Current Outpatient Medications   Medication Sig Dispense Refill    hydrOXYzine 10 MG Oral Tab       Potassium Chloride ER 20 MEQ Oral Tab CR Take 40 mEq by mouth daily for 10 days. 20 tablet 0    traZODone 100 MG Oral Tab Take 1 tablet (100 mg total) by mouth nightly as needed for Sleep (sleep/insomnia). 30 tablet 0         Review of Systems:  Review of Systems   Constitutional:  Negative for chills, diaphoresis and fever.   HENT:  Negative for congestion, ear discharge, ear pain, sinus pressure, sinus pain and sore throat.    Eyes:  Negative for pain and discharge.   Respiratory:  Negative for cough, chest tightness, shortness of breath and wheezing.    Cardiovascular:  Negative for chest pain and palpitations.   Gastrointestinal:  Positive for vomiting (resolved at this time.). Negative for abdominal pain, diarrhea and nausea.        Satiety with mild eating.   Endocrine: Negative for cold intolerance and heat intolerance.   Genitourinary:  Negative for dysuria, flank pain, frequency and urgency.   Musculoskeletal:  Negative for joint swelling.   Skin:  Negative for rash.   Neurological:  Positive for dizziness (Resolved at this time.). Negative for syncope and headaches.   Psychiatric/Behavioral:  Negative for confusion and hallucinations.        Objective:   /80   Pulse 112   Temp 97.8 °F (36.6 °C)   Ht 5' 8\" (1.727 m)   Wt 205 lb 3.2 oz (93.1 kg)   LMP 08/11/2024 (Approximate)   SpO2 100%   BMI 31.20 kg/m²  Estimated body mass index is 31.2 kg/m² as calculated from the following:    Height as of this encounter: 5' 8\" (1.727 m).    Weight as of this encounter: 205 lb 3.2 oz (93.1 kg).  Physical Exam  Constitutional:       General: She is not in acute distress.     Appearance: Normal appearance. She is obese. She is not ill-appearing or toxic-appearing.   HENT:      Head: Normocephalic and atraumatic.      Right Ear: Tympanic membrane  and ear canal normal.      Left Ear: Tympanic membrane and ear canal normal.      Mouth/Throat:      Mouth: Mucous membranes are moist.      Pharynx: Oropharynx is clear. No oropharyngeal exudate or posterior oropharyngeal erythema.   Eyes:      Extraocular Movements: Extraocular movements intact.      Pupils: Pupils are equal, round, and reactive to light.   Cardiovascular:      Rate and Rhythm: Normal rate and regular rhythm.      Heart sounds: Normal heart sounds. No murmur heard.     No gallop.   Pulmonary:      Effort: Pulmonary effort is normal. No respiratory distress.      Breath sounds: Normal breath sounds. No stridor. No wheezing, rhonchi or rales.   Abdominal:      General: Bowel sounds are normal.      Palpations: Abdomen is soft.      Tenderness: There is no abdominal tenderness. There is no right CVA tenderness, left CVA tenderness or guarding.   Musculoskeletal:         General: No swelling.      Cervical back: Normal range of motion and neck supple. No rigidity or tenderness.   Skin:     General: Skin is warm and dry.   Neurological:      General: No focal deficit present.      Mental Status: She is alert and oriented to person, place, and time. Mental status is at baseline.   Psychiatric:         Mood and Affect: Mood normal.         Behavior: Behavior normal.         Thought Content: Thought content normal.         Judgment: Judgment normal.         Assessment & Plan:        Recent Results (from the past 72 hour(s))   URINALYSIS, AUTO, W/O SCOPE    Collection Time: 08/20/24  5:38 PM   Result Value Ref Range    Glucose Urine Negative Negative mg/dL    Bilirubin Urine Negative Negative    Ketones, UA Trace Negative - Trace mg/dL    Spec Gravity 1.030 1.005 - 1.030    Blood Urine Negative Negative    PH Urine 5.5 5.0 - 8.0    Protein Urine Trace Negative - Trace mg/dL    Urobilinogen Urine 0.2 0.2 - 1.0 mg/dL    Nitrite Urine Negative Negative    Leukocyte Esterase Urine Negative Negative     APPEARANCE clear Clear    Color Urine yellow Yellow    Multistix Lot# [id number] Numeric    Multistix Expiration Date 03/31/2025 Date   POC Urine pregnancy test [40169]    Collection Time: 08/20/24  5:40 PM   Result Value Ref Range    Pregnancy Test, Urine negative     Control Line Present with a clear background (yes/no) yes Yes/No    Kit Lot # [id number] Numeric    Kit Expiration Date 04/08/2025 Date         Admission on 08/14/2024, Discharged on 08/14/2024   Component Date Value Ref Range Status    POCT Rapid Strep 08/14/2024 Negative  Negative Final    WBC IC 08/14/2024 4.6  4.0 - 11.0 x10ˆ3/uL Final    RBC IC 08/14/2024 4.36  3.80 - 5.30 X10ˆ6/uL Final    HGB IC 08/14/2024 11.4 (L)  12.0 - 16.0 g/dL Final    HCT IC 08/14/2024 37.4  35.0 - 48.0 % Final    MCV IC 08/14/2024 85.8  80.0 - 100.0 fL Final    MCH IC 08/14/2024 26.1  26.0 - 34.0 pg Final    MCHC IC 08/14/2024 30.5 (L)  31.0 - 37.0 g/dL Final    PLT IC 08/14/2024 211.0  150.0 - 450.0 X10ˆ3/uL Final    # Neutrophil 08/14/2024 0.9 (L)  1.5 - 7.7 X10ˆ3/uL Final    # Lymphocyte 08/14/2024 3.1  1.0 - 4.0 X10ˆ3/uL Final    # Mixed Cells 08/14/2024 0.6  0.1 - 1.0 X10ˆ3/uL Final    Neutrophil % 08/14/2024 19.7  % Final    Lymphocyte % 08/14/2024 67.7  % Final    Mixed Cell % 08/14/2024 12.6  % Final    POCT Mono 08/14/2024 Negative  Negative Final    Ventricular rate 08/14/2024 88  BPM Final    Atrial rate 08/14/2024 88  BPM Final    P-R Interval 08/14/2024 148  ms Final    QRS Duration 08/14/2024 80  ms Final    Q-T Interval 08/14/2024 364  ms Final    QTC Calculation (Bezet) 08/14/2024 440  ms Final    P Axis 08/14/2024 52  degrees Final    R Axis 08/14/2024 47  degrees Final    T Axis 08/14/2024 21  degrees Final    ISTAT Sodium 08/14/2024 138  136 - 145 mmol/L Final    ISTAT BUN 08/14/2024 5 (L)  7 - 18 mg/dL Final    ISTAT Potassium 08/14/2024 2.9 (LL)  3.6 - 5.1 mmol/L Final    ISTAT Chloride 08/14/2024 100  98 - 112 mmol/L Final    ISTAT Ionized Calcium  08/14/2024 1.10 (L)  1.12 - 1.32 mmol/L Final    ISTAT Hematocrit 08/14/2024 40  34 - 50 % Final    ISTAT Glucose 08/14/2024 100 (H)  70 - 99 mg/dL Final    ISTAT TCO2 08/14/2024 26  21 - 32 mmol/L Final    ISTAT Creatinine 08/14/2024 0.70  0.55 - 1.02 mg/dL Final    This test may exhibit falsely elevated results when a sample is collected from a patient who is presently taking Hydroxyurea. If patient is consuming Hydroxyurea, i-STAT creatinine analysis should be considered inaccurate and a sample should be referred to the Central Lab for analysis, if clinically indicated.    eGFR-Cr 08/14/2024 124  >=60 mL/min/1.73m2 Final    ISTAT Troponin 08/14/2024 <0.02  <0.045 ng/mL ng/mL Final    WBC IC 08/14/2024 4.5  4.0 - 11.0 x10ˆ3/uL Final    RBC IC 08/14/2024 4.34  3.80 - 5.30 X10ˆ6/uL Final    HGB IC 08/14/2024 11.4 (L)  12.0 - 16.0 g/dL Final    HCT IC 08/14/2024 37.6  35.0 - 48.0 % Final    MCV IC 08/14/2024 86.6  80.0 - 100.0 fL Final    MCH IC 08/14/2024 26.3  26.0 - 34.0 pg Final    MCHC IC 08/14/2024 30.3 (L)  31.0 - 37.0 g/dL Final    PLT IC 08/14/2024 215.0  150.0 - 450.0 X10ˆ3/uL Final    # Neutrophil 08/14/2024 0.8 (L)  1.5 - 7.7 X10ˆ3/uL Final    # Lymphocyte 08/14/2024 3.0  1.0 - 4.0 X10ˆ3/uL Final    # Mixed Cells 08/14/2024 0.7  0.1 - 1.0 X10ˆ3/uL Final    Neutrophil % 08/14/2024 17.9  % Final    Lymphocyte % 08/14/2024 66.4  % Final    Mixed Cell % 08/14/2024 15.7  % Final    ISTAT Sodium 08/14/2024 138  136 - 145 mmol/L Final    ISTAT BUN 08/14/2024 6 (L)  7 - 18 mg/dL Final    ISTAT Potassium 08/14/2024 3.2 (L)  3.6 - 5.1 mmol/L Final    ISTAT Chloride 08/14/2024 99  98 - 112 mmol/L Final    ISTAT Ionized Calcium 08/14/2024 1.15  1.12 - 1.32 mmol/L Final    ISTAT Hematocrit 08/14/2024 40  34 - 50 % Final    ISTAT Glucose 08/14/2024 100 (H)  70 - 99 mg/dL Final    ISTAT TCO2 08/14/2024 26  21 - 32 mmol/L Final    ISTAT Creatinine 08/14/2024 0.70  0.55 - 1.02 mg/dL Final    This test may exhibit falsely  elevated results when a sample is collected from a patient who is presently taking Hydroxyurea. If patient is consuming Hydroxyurea, i-STAT creatinine analysis should be considered inaccurate and a sample should be referred to the Central Lab for analysis, if clinically indicated.    eGFR-Cr 08/14/2024 124  >=60 mL/min/1.73m2 Final     XR CHEST PA + LAT CHEST (CPT=71046)    Result Date: 8/14/2024  CONCLUSION: No radiographic evidence of acute cardiopulmonary abnormality.    [id number]    Dictated by (CST): Tevin Barajas MD on 8/14/2024 at 3:11 PM     Finalized by (CST): Tevin Barajas MD on 8/14/2024 at 3:12 PM             1. Hypokalemia (Primary)  -BMP after completion of potassium supplementation  -     Basic Metabolic Panel (8); Future; Expected date: 08/20/2024  2. History of chlamydia  Around 1 year ago that was treated.  -     Chlamydia/Gc Amplification; Future; Expected date: 08/20/2024  3. Screening for STD (sexually transmitted disease)  -     Chlamydia/Gc Amplification; Future; Expected date: 08/20/2024  -     HCV Antibody; Future; Expected date: 08/20/2024  -     HIV Ag/Ab Combo; Future; Expected date: 08/20/2024  -     T Pallidum Screening Cascade; Future; Expected date: 08/20/2024  4. Obesity, Class I, BMI 30-34.9  -Healthy diet and lifestyle  -Weight loss  -     Hemoglobin A1C; Future; Expected date: 08/20/2024  5. Mild anemia  -CBC and iron studies as previously ordered  6. Other fatigue  -     TSH W Reflex To Free T4; Future; Expected date: 08/20/2024  7. Dizziness  Resolved at this time.  -ED precautions  -     Urine Pregnancy Test  8. Nausea and vomiting, unspecified vomiting type  Resolved at this time.  -ED precautions  -     Urine Pregnancy Test  -     Urine Culture, Routine; Future; Expected date: 08/20/2024  -     URINALYSIS, AUTO, W/O SCOPE  9. Early satiety  -ED precautions  -     GASTRO - INTERNAL        Return in about 6 weeks (around 10/1/2024).    Benjamin Light MD, 8/20/2024, 4:55 PM        Time spent: 40 minutes, obtaining history, evaluating patient, discussing differential diagnosis, recommendations, diagnostic testing options, treatment options, risks and benefits of my recommendations, counseling patient, discussing prognosis/expectations, and follow up with patient (and/or parent/guardian) as well as completing documentation.

## 2024-08-21 LAB
C TRACH DNA SPEC QL NAA+PROBE: NEGATIVE
N GONORRHOEA DNA SPEC QL NAA+PROBE: NEGATIVE

## 2024-08-23 ENCOUNTER — PATIENT MESSAGE (OUTPATIENT)
Dept: FAMILY MEDICINE CLINIC | Facility: CLINIC | Age: 24
End: 2024-08-23

## 2024-08-23 DIAGNOSIS — E87.6 HYPOKALEMIA: Primary | ICD-10-CM

## 2024-08-27 ENCOUNTER — LAB ENCOUNTER (OUTPATIENT)
Dept: LAB | Facility: HOSPITAL | Age: 24
End: 2024-08-27
Attending: STUDENT IN AN ORGANIZED HEALTH CARE EDUCATION/TRAINING PROGRAM
Payer: COMMERCIAL

## 2024-08-27 DIAGNOSIS — Z11.3 SCREENING FOR STD (SEXUALLY TRANSMITTED DISEASE): ICD-10-CM

## 2024-08-27 DIAGNOSIS — E55.9 VITAMIN D DEFICIENCY: ICD-10-CM

## 2024-08-27 DIAGNOSIS — N92.0 MENORRHAGIA WITH REGULAR CYCLE: ICD-10-CM

## 2024-08-27 DIAGNOSIS — R53.83 OTHER FATIGUE: ICD-10-CM

## 2024-08-27 DIAGNOSIS — E87.6 HYPOKALEMIA: ICD-10-CM

## 2024-08-27 DIAGNOSIS — E66.9 OBESITY, CLASS I, BMI 30-34.9: ICD-10-CM

## 2024-08-27 DIAGNOSIS — D64.9 MILD ANEMIA: ICD-10-CM

## 2024-08-27 LAB
ALBUMIN SERPL-MCNC: 4 G/DL (ref 3.2–4.8)
ALBUMIN/GLOB SERPL: 1.4 {RATIO} (ref 1–2)
ALP LIVER SERPL-CCNC: 41 U/L
ALT SERPL-CCNC: 48 U/L
ANION GAP SERPL CALC-SCNC: 6 MMOL/L (ref 0–18)
AST SERPL-CCNC: 34 U/L (ref ?–34)
BASOPHILS # BLD AUTO: 0.05 X10(3) UL (ref 0–0.2)
BASOPHILS NFR BLD AUTO: 1.1 %
BILIRUB SERPL-MCNC: 0.5 MG/DL (ref 0.3–1.2)
BUN BLD-MCNC: 13 MG/DL (ref 9–23)
CALCIUM BLD-MCNC: 9.3 MG/DL (ref 8.7–10.4)
CHLORIDE SERPL-SCNC: 106 MMOL/L (ref 98–112)
CO2 SERPL-SCNC: 24 MMOL/L (ref 21–32)
CREAT BLD-MCNC: 0.82 MG/DL
DEPRECATED HBV CORE AB SER IA-ACNC: 8.1 NG/ML
EGFRCR SERPLBLD CKD-EPI 2021: 102 ML/MIN/1.73M2 (ref 60–?)
EOSINOPHIL # BLD AUTO: 0.09 X10(3) UL (ref 0–0.7)
EOSINOPHIL NFR BLD AUTO: 2 %
ERYTHROCYTE [DISTWIDTH] IN BLOOD BY AUTOMATED COUNT: 17.8 %
EST. AVERAGE GLUCOSE BLD GHB EST-MCNC: 126 MG/DL (ref 68–126)
FASTING STATUS PATIENT QL REPORTED: NO
GLOBULIN PLAS-MCNC: 2.8 G/DL (ref 2–3.5)
GLUCOSE BLD-MCNC: 103 MG/DL (ref 70–99)
HBA1C MFR BLD: 6 % (ref ?–5.7)
HCT VFR BLD AUTO: 32.6 %
HCV AB SERPL QL IA: NONREACTIVE
HGB BLD-MCNC: 10.3 G/DL
IMM GRANULOCYTES # BLD AUTO: 0.01 X10(3) UL (ref 0–1)
IMM GRANULOCYTES NFR BLD: 0.2 %
IRON SATN MFR SERPL: 6 %
IRON SERPL-MCNC: 24 UG/DL
LYMPHOCYTES # BLD AUTO: 2.79 X10(3) UL (ref 1–4)
LYMPHOCYTES NFR BLD AUTO: 60.9 %
MCH RBC QN AUTO: 26.3 PG (ref 26–34)
MCHC RBC AUTO-ENTMCNC: 31.6 G/DL (ref 31–37)
MCV RBC AUTO: 83.2 FL
MONOCYTES # BLD AUTO: 0.5 X10(3) UL (ref 0.1–1)
MONOCYTES NFR BLD AUTO: 10.9 %
NEUTROPHILS # BLD AUTO: 1.14 X10 (3) UL (ref 1.5–7.7)
NEUTROPHILS # BLD AUTO: 1.14 X10(3) UL (ref 1.5–7.7)
NEUTROPHILS NFR BLD AUTO: 24.9 %
OSMOLALITY SERPL CALC.SUM OF ELEC: 282 MOSM/KG (ref 275–295)
PLATELET # BLD AUTO: 242 10(3)UL (ref 150–450)
POTASSIUM SERPL-SCNC: 4.3 MMOL/L (ref 3.5–5.1)
PROT SERPL-MCNC: 6.8 G/DL (ref 5.7–8.2)
RBC # BLD AUTO: 3.92 X10(6)UL
SODIUM SERPL-SCNC: 136 MMOL/L (ref 136–145)
T PALLIDUM AB SER QL IA: NONREACTIVE
TOTAL IRON BINDING CAPACITY: 393 UG/DL (ref 250–425)
TRANSFERRIN SERPL-MCNC: 312 MG/DL (ref 250–380)
TSI SER-ACNC: 1.03 MIU/ML (ref 0.55–4.78)
VIT D+METAB SERPL-MCNC: 32.9 NG/ML (ref 30–100)
WBC # BLD AUTO: 4.6 X10(3) UL (ref 4–11)

## 2024-08-27 PROCEDURE — 83540 ASSAY OF IRON: CPT

## 2024-08-27 PROCEDURE — 80053 COMPREHEN METABOLIC PANEL: CPT

## 2024-08-27 PROCEDURE — 83550 IRON BINDING TEST: CPT

## 2024-08-27 PROCEDURE — 86803 HEPATITIS C AB TEST: CPT

## 2024-08-27 PROCEDURE — 36415 COLL VENOUS BLD VENIPUNCTURE: CPT

## 2024-08-27 PROCEDURE — 82306 VITAMIN D 25 HYDROXY: CPT

## 2024-08-27 PROCEDURE — 86780 TREPONEMA PALLIDUM: CPT

## 2024-08-27 PROCEDURE — 85025 COMPLETE CBC W/AUTO DIFF WBC: CPT

## 2024-08-27 PROCEDURE — 83036 HEMOGLOBIN GLYCOSYLATED A1C: CPT

## 2024-08-27 PROCEDURE — 82728 ASSAY OF FERRITIN: CPT

## 2024-08-27 PROCEDURE — 84443 ASSAY THYROID STIM HORMONE: CPT

## 2024-08-27 PROCEDURE — 87389 HIV-1 AG W/HIV-1&-2 AB AG IA: CPT

## 2024-08-28 DIAGNOSIS — D50.9 IRON DEFICIENCY ANEMIA, UNSPECIFIED IRON DEFICIENCY ANEMIA TYPE: Primary | ICD-10-CM

## 2024-10-21 NOTE — H&P
[facility] - Gastroenterology                                                                                                               Reason for consult: eval    Requesting physician or provider: Benjamin Light MD    Chief Complaint   Patient presents with    Diarrhea    Vomiting    Nausea    Stomach Pain       HPI:   Raheem Jean is a 24 year old year-old female without significant PMHx:    she is here today for evaluation abd pain, feeling of fullness    She is here today with her boyfriend's mom who is a nurse    Ed visit 8/2024 -  2 weeks prior to visit started having n/v/d. Emesis bile. Had gen abd pain. Onset w/o travel, cahnge in diet/activity/medication.  Was having stress but no h/o IBS.   Hgb 10.3 (8/2024)   Fanny (8/2024)  Tsh (8/2024)  Cxr, EKG8/2024 unremarkable  Had f/U with pcp - some sx had improved, but not resolved  Referred to gi  K def - supplemented  Vit d def - supplemented  Started omeprazole - stopped ppi after 1-2 weeks of use.    She has bm every other day at baseline.  When she was having diarrhea had bm 3x/day. Diarrhea lasted a few days. No h/o diarrhea.  No h/o constipation.  Stools returning.  she denies brbpr and/or melena.    she denies acid reflux and/or heartburn. she denies dysphagia, odynophagia and/or globus. she denies abdominal pain. she denies nausea and/or vomiting.  Appetite down with illness and had weight loss. Now improving.      Has heavy periods at times    NSAIDS: rare  Tobacco: vape  Alcohol: rare  Marijuana: occasional  Illicit drugs: no    No FDR w/ GI malignancy, IBD, celiac  Maternal great gf had colon ca in his 90's    No history of adverse reaction to sedation  No HUY  No anticoagulants  No pacemaker/defibrillator  No pain medications and/or sleep aides    Last colonoscopy: no  Last EGD: no    Wt Readings from Last 6 Encounters:   10/22/24 200 lb (90.7 kg)   08/20/24  205 lb 3.2 oz (93.1 kg)   03/04/24 219 lb 6.4 oz (99.5 kg)        History, Medications, Allergies, ROS:      History reviewed. No pertinent past medical history.   History reviewed. No pertinent surgical history.   Family Hx:   Family History   Problem Relation Age of Onset    Bipolar Disorder Son       Social History:   Social History     Socioeconomic History    Marital status: Single   Tobacco Use    Smoking status: Never    Smokeless tobacco: Never   Vaping Use    Vaping status: Every Day    Substances: Nicotine, THC    Devices: Disposable   Substance and Sexual Activity    Alcohol use: Yes     Alcohol/week: 2.0 standard drinks of alcohol     Types: 2 Standard drinks or equivalent per week     Comment: social drinker. Last drink was 2- had 2 mixed drinks/    Drug use: Yes     Types: Cannabis     Social Drivers of Health     Financial Resource Strain: Low Risk  (2/16/2024)    [facility]    Financial Resource Strain     In the past year, have you or any family members you live with been unable to get any of the following when it was really needed? Check all that apply.: None   Food Insecurity: Low Risk  (2/16/2024)    [facility] from [facility], [facility]    Food Insecurity     Within the past 12 months, you worried that your food would run out before you got money to buy more.  : Never true     Within the past 12 months, the food you bought just didn't last and you didn't have money to get more. : Never true   Transportation Needs: Not At Risk (2/16/2024)    [facility] from [facility], [facility]    Transportation Needs     In the past 12 months, has lack of reliable transportation kept you from medical appointments, meetings, work or from getting things needed for daily living? : No   Social Connections: Low Risk  (2/16/2024)    Received from [facility], [facility]    Social Connections      How often do you see or talk to people that you care about and feel close to? (For example: talking to friends on the phone, visiting friends or family, going to Religious or club meetings): 5 or more times a week        Medications (Active prior to today's visit):  Current Outpatient Medications   Medication Sig Dispense Refill    hydrOXYzine 10 MG Oral Tab       traZODone 100 MG Oral Tab Take 1 tablet (100 mg total) by mouth nightly as needed for Sleep (sleep/insomnia). 30 tablet 0       Allergies:  Allergies[1]    ROS:   CONSTITUTIONAL: negative for fevers, chills, sweats and weight loss  EYES Negative for red eyes, yellow eyes, changes in vision  HEENT: Negative for dysphagia and hoarseness  RESPIRATORY: Negative for cough and shortness of breath  CARDIOVASCULAR: Negative for chest pain, palpitations  GASTROINTESTINAL: See HPI  GENITOURINARY: Negative for dysuria and frequency  MUSCULOSKELETAL: Negative for arthralgias and myalgias  NEUROLOGICAL: Negative for dizziness and headaches  BEHAVIOR/PSYCH: Negative for anxiety and poor appetite    PHYSICAL EXAM:   Blood pressure 112/74, pulse 93, height 5' 8\" (1.727 m), weight 200 lb (90.7 kg), last menstrual period 10/03/2024.    GEN: WD/WN, NAD  HEENT: Supple symmetrical, trachea midline  CV: RRR, the extremities are warm and well perfused   LUNGS: No increased work of breathing  ABDOMEN: No scars, normal bowel sounds, soft, non-tender, non-distended no rebound or guarding, no masses, no hepatomegaly  MSK: No redness, no warmth, no swelling of joints  SKIN: No jaundice, no erythema, no rashes  HEMATOLOGIC: No bleeding, no bruising  NEURO: Alert and interactive, normal gait    Labs/Imaging/Procedures:     Patient's pertinent labs and imaging were reviewed and discussed with patient today.        .  ASSESSMENT/PLAN:   Raheem Paiz Miranda is a 24 year old year-old female without significant PMHx:    #OPAL  Acute onset sx of n/v/d/abd pain. Eval in urgent  care remarkable for vit d def, hypokalemia, hermelinda.  Sx now improving.  Remote h/o colon ca. No fhx IBD, celiac.  Does have heavy periods and think is likely cause of hermelinda.  We discussed considering gi work-up to exclude inflammatory bowel disease, however given no h/o sx and improved now, she is electing to watch and wait.  Recommendations below   Recommend:  -see gyne  -hypylori testing  -return to clinic or consider er if return of symptoms  -stop vaping    Orders This Visit:  No orders of the defined types were placed in this encounter.      Meds This Visit:  Requested Prescriptions      No prescriptions requested or ordered in this encounter       Imaging & Referrals:  None      Reyna Mitchell, APRN   10/22/2024        This note was partially prepared using Dragon Medical voice recognition dictation software. As a result, errors may occur. When identified, these errors have been corrected. While every attempt is made to correct errors during dictation, discrepancies may still exist.          [1] No Known Allergies

## 2024-10-22 ENCOUNTER — OFFICE VISIT (OUTPATIENT)
Facility: CLINIC | Age: 24
End: 2024-10-22
Payer: COMMERCIAL

## 2024-10-22 ENCOUNTER — LAB ENCOUNTER (OUTPATIENT)
Dept: LAB | Facility: HOSPITAL | Age: 24
End: 2024-10-22
Attending: NURSE PRACTITIONER
Payer: COMMERCIAL

## 2024-10-22 VITALS
BODY MASS INDEX: 30.31 KG/M2 | HEIGHT: 68 IN | WEIGHT: 200 LBS | SYSTOLIC BLOOD PRESSURE: 112 MMHG | DIASTOLIC BLOOD PRESSURE: 74 MMHG | HEART RATE: 93 BPM

## 2024-10-22 DIAGNOSIS — D50.9 IRON DEFICIENCY ANEMIA, UNSPECIFIED IRON DEFICIENCY ANEMIA TYPE: Primary | ICD-10-CM

## 2024-10-22 DIAGNOSIS — D50.9 IRON DEFICIENCY ANEMIA, UNSPECIFIED IRON DEFICIENCY ANEMIA TYPE: ICD-10-CM

## 2024-10-22 PROCEDURE — 3008F BODY MASS INDEX DOCD: CPT | Performed by: NURSE PRACTITIONER

## 2024-10-22 PROCEDURE — 3078F DIAST BP <80 MM HG: CPT | Performed by: NURSE PRACTITIONER

## 2024-10-22 PROCEDURE — 99243 OFF/OP CNSLTJ NEW/EST LOW 30: CPT | Performed by: NURSE PRACTITIONER

## 2024-10-22 PROCEDURE — 83013 H PYLORI (C-13) BREATH: CPT

## 2024-10-22 PROCEDURE — 3074F SYST BP LT 130 MM HG: CPT | Performed by: NURSE PRACTITIONER

## 2024-10-22 NOTE — PATIENT INSTRUCTIONS
Recommend:  -see gyne  -hypylori testing  -return to clinic or consider er if return of symptoms  -stop vaping

## 2024-10-24 LAB — H PYLORI BREATH TEST: NEGATIVE

## 2024-10-28 ENCOUNTER — NURSE TRIAGE (OUTPATIENT)
Dept: FAMILY MEDICINE CLINIC | Facility: CLINIC | Age: 24
End: 2024-10-28

## 2024-10-28 ENCOUNTER — OFFICE VISIT (OUTPATIENT)
Dept: FAMILY MEDICINE CLINIC | Facility: CLINIC | Age: 24
End: 2024-10-28
Payer: COMMERCIAL

## 2024-10-28 ENCOUNTER — LAB ENCOUNTER (OUTPATIENT)
Dept: LAB | Facility: REFERENCE LAB | Age: 24
End: 2024-10-28
Attending: NURSE PRACTITIONER
Payer: COMMERCIAL

## 2024-10-28 VITALS
HEIGHT: 68 IN | WEIGHT: 191.63 LBS | SYSTOLIC BLOOD PRESSURE: 112 MMHG | TEMPERATURE: 98 F | RESPIRATION RATE: 18 BRPM | DIASTOLIC BLOOD PRESSURE: 78 MMHG | HEART RATE: 106 BPM | OXYGEN SATURATION: 96 % | BODY MASS INDEX: 29.04 KG/M2

## 2024-10-28 DIAGNOSIS — F41.1 GAD (GENERALIZED ANXIETY DISORDER): ICD-10-CM

## 2024-10-28 DIAGNOSIS — Z32.01 POSITIVE PREGNANCY TEST (HCC): Primary | ICD-10-CM

## 2024-10-28 DIAGNOSIS — Z32.01 POSITIVE PREGNANCY TEST (HCC): ICD-10-CM

## 2024-10-28 DIAGNOSIS — R82.90 ABNORMAL URINALYSIS: ICD-10-CM

## 2024-10-28 DIAGNOSIS — F33.2 MAJOR DEPRESSIVE DISORDER, RECURRENT SEVERE WITHOUT PSYCHOTIC FEATURES (HCC): ICD-10-CM

## 2024-10-28 LAB
APPEARANCE: CLEAR
B-HCG SERPL-ACNC: <2.6 MIU/ML
CONTROL LINE PRESENT WITH A CLEAR BACKGROUND (YES/NO): YES YES/NO
GLUCOSE (URINE DIPSTICK): NEGATIVE MG/DL
KETONES (URINE DIPSTICK): 40 MG/DL
KIT LOT #: NORMAL NUMERIC
LEUKOCYTES: NEGATIVE
MULTISTIX LOT#: ABNORMAL NUMERIC
NITRITE, URINE: NEGATIVE
PH, URINE: 6 (ref 4.5–8)
PREGNANCY TEST, URINE: NEGATIVE
PROTEIN (URINE DIPSTICK): 100 MG/DL
SPECIFIC GRAVITY: 1.03 (ref 1–1.03)
UROBILINOGEN,SEMI-QN: 1 MG/DL (ref 0–1.9)

## 2024-10-28 PROCEDURE — 87086 URINE CULTURE/COLONY COUNT: CPT | Performed by: NURSE PRACTITIONER

## 2024-10-28 PROCEDURE — 84702 CHORIONIC GONADOTROPIN TEST: CPT | Performed by: NURSE PRACTITIONER

## 2024-10-28 RX ORDER — FLUOXETINE 10 MG/1
20 CAPSULE ORAL DAILY
COMMUNITY
End: 2024-10-28

## 2024-10-28 NOTE — PROGRESS NOTES
Chief Complaint:   Chief Complaint   Patient presents with    Checkup     Pregnancy Test.        HPI:   This is a 24 year old female coming in for positive home pregnancy test. Reports LMP 10/3. She started having some spotting on Friday-Saturday that required her to wear a panty liner. Thinks she saw a faint line on a home pregnancy test Friday, felt that was darker Saturday and same on Sunday. She had scant vaginal blood when wiping after urinating today but no other spotting/bleeding. No significant cramping. Had some nausea and vomiting yesterday. No increased fatigue. Had breast tenderness about a week ago, resolved.     Was seeing psychiatry for depression/anxiety but that provider has not seen her in some time. She has been out of fluoxetine x 2 months, feeling depressed/anxious again. Would like to restart a medicine. Not trying to get pregnant but not preventing it. Takes trazodone rarely, PRN. Uses hydroxyzine PRN for anxiety. No SHIP.    Results for orders placed or performed in visit on 10/28/24   POC Urine pregnancy test [19036]    Collection Time: 10/28/24 11:28 AM   Result Value Ref Range    Pregnancy Test, Urine Negative     Control Line Present with a clear background (yes/no) Yes Yes/No    Kit Lot # [id number] Numeric    Kit Expiration Date 04/08/2025 Date             Past Medical History:    Anxiety    Depression     History reviewed. No pertinent surgical history.  Social History:  Social History     Socioeconomic History    Marital status: Single   Tobacco Use    Smoking status: Never     Passive exposure: Never    Smokeless tobacco: Never   Vaping Use    Vaping status: Former    Substances: Nicotine, THC    Devices: Disposable   Substance and Sexual Activity    Alcohol use: Not Currently     Alcohol/week: 2.0 standard drinks of alcohol     Types: 2 Standard drinks or equivalent per week     Comment: social drinker. Last drink was 2- had 2 mixed drinks/    Drug use: Yes     Types: Cannabis    Other Topics Concern    Caffeine Concern No    Exercise No    Seat Belt No    Special Diet No    Stress Concern No    Weight Concern No     Social Drivers of Health     Financial Resource Strain: Low Risk  (2/16/2024)    [facility], [facility]    Financial Resource Strain     In the past year, have you or any family members you live with been unable to get any of the following when it was really needed? Check all that apply.: None   Food Insecurity: Low Risk  (2/16/2024)    [facility], [facility]    Food Insecurity     Within the past 12 months, you worried that your food would run out before you got money to buy more.  : Never true     Within the past 12 months, the food you bought just didn't last and you didn't have money to get more. : Never true   Transportation Needs: Not At Risk (2/16/2024)    [facility], [facility]    Transportation Needs     In the past 12 months, has lack of reliable transportation kept you from medical appointments, meetings, work or from getting things needed for daily living? : No   Social Connections: Low Risk  (2/16/2024)    [facility] from [facility], [facility]    Social Connections     How often do you see or talk to people that you care about and feel close to? (For example: talking to friends on the phone, visiting friends or family, going to [employer] or club meetings): 5 or more times a week     Family History:  Family History   Problem Relation Age of Onset    Bipolar Disorder Son      Allergies:  Allergies[1]  Current Meds:  Current Outpatient Medications   Medication Sig Dispense Refill    sertraline 50 MG Oral Tab Take 1/2 tablet daily x 1 week, then 1 tablet daily 90 tablet 0    hydrOXYzine 10 MG Oral Tab Take 2.5 tablets (25 mg total) by mouth.      traZODone 100 MG Oral Tab Take 1 tablet (100 mg total) by mouth nightly as needed for Sleep  (sleep/insomnia). 30 tablet 0      Counseling given: Not Answered       REVIEW OF SYSTEMS:   CONSTITUTIONAL:  Denies unusual weight gain/loss, fever, chills, or fatigue.  CARDIOVASCULAR:  Denies chest pain, palpitations, edema, dyspnea on exertion or at rest.  RESPIRATORY:  Denies shortness of breath, wheezing, cough or sputum.  GASTROINTESTINAL:  Denies abdominal pain, constipation, diarrhea, or blood in stool. Had nausea and vomiting x 1 yesterday, none today.  GENITOURINARY: Denies dysuria, hematuria, frequency. See HPI.  NEUROLOGICAL:  Denies headache, seizures, dizziness.  PSYCHIATRIC:  +Anxiety and depression as above. Denies suicidal thoughts.    EXAM:   /78 (BP Location: Right arm, Patient Position: Sitting, Cuff Size: adult)   Pulse 106   Temp 97.8 °F (36.6 °C) (Temporal)   Resp 18   Ht 5' 8\" (1.727 m)   Wt 191 lb 9.6 oz (86.9 kg)   LMP 10/03/2024 (Approximate)   SpO2 96%   BMI 29.13 kg/m²  Estimated body mass index is 29.13 kg/m² as calculated from the following:    Height as of this encounter: 5' 8\" (1.727 m).    Weight as of this encounter: 191 lb 9.6 oz (86.9 kg).   Vital signs reviewed.Appears stated age, well groomed, in no acute distress.  Physical Exam:  GEN:  Patient is alert, awake and oriented, well developed, well nourished.  SKIN: No rashes, no skin lesion, no bruising, good turgor.  HEART:  Regular rate and rhythm, no murmurs, rubs or gallops.  LUNGS: Clear to auscultation bilterally, no rales/rhonchi/wheezing.  ABDOMEN:  Soft, nondistended, nontender, bowel sounds normal in all 4 quadrants, no masses, no hepatosplenomegaly.  NEURO:  No deficit, normal gait, strength and tone, sensory intact.    ASSESSMENT AND PLAN:   1. Positive pregnancy test (HCC)  -Urine pregnancy test negative in office. She showed me pictures of her at-home tests and I feel that they were likely negative, one had a possible faint positive line but unclear.   -Will check hcg on blood to confirm.  -Referred  to gyne to establish care.   -Discussed that if she is not preventing pregnancy, recommended avoiding fetal toxic medications including hydroxyzine. Discussed other substances to avoid. Should start daily prenatal vitamin.   - OBG Referral - In Network  - POC Urine pregnancy test [72492]  - HCG, Beta Subunit, Quant [E]; Future    2. Abnormal urinalysis  -UA abnormal, will check culture.   - Urine Culture, Routine [E]; Future    3. Major depressive disorder, recurrent severe without psychotic features (HCC)  -Will start sertraline daily as this has been most studied in pregnancy and would not need to be stopped if she becomes pregnant in the future. Discussed how to take this medicine and possible side effects.  -Discussed that this may take 3-4 weeks to start working. Follow-up with me then, or can see psychiatry as referred.   -To ER with BERNARDO.  - sertraline 50 MG Oral Tab; Take 1/2 tablet daily x 1 week, then 1 tablet daily  Dispense: 90 tablet; Refill: 0  - [facility] NAVIGATOR    4. KARMA (generalized anxiety disorder)  -See above.  - sertraline 50 MG Oral Tab; Take 1/2 tablet daily x 1 week, then 1 tablet daily  Dispense: 90 tablet; Refill: 0  - [facility] NAVIG[facility]      Meds & Refills for this Visit:  Requested Prescriptions     Signed Prescriptions Disp Refills    sertraline 50 MG Oral Tab 90 tablet 0     Sig: Take 1/2 tablet daily x 1 week, then 1 tablet daily         Problem List:  Patient Active Problem List   Diagnosis    Major depressive disorder, recurrent severe without psychotic features (HCC)    KARMA (generalized anxiety disorder)    PTSD (post-traumatic stress disorder)    Cannabis use disorder       Rossana Dover, APRN  10/28/2024  11:31 AM         [1] No Known Allergies

## 2024-10-28 NOTE — TELEPHONE ENCOUNTER
[facility] [employer]    Patient has been having \"faint lines\" on home pregnancy tests. First HPT positive 10/24/24.     LMP 10/3/24.     Patient states she has never been pregnant before.    Thursday 10/24/24 patient started spotting.     Patient says that on Friday she had more of a \"flow\".   but nothing like her normal period. Patient did have some \"tiny clots\"    Patient did have bright red/then went to light pink/brown.     Patient had light pink spotting when wiping yesterday.    No spotting today.     Patient denies having any fevers, abdominal pain, or acute shoulder pain.     Patient says she thinks \"she has slipped rib syndrome and sometimes she gets shoulder pain with that, however, she denies having any new onset shoulder pain.     Patient scheduled to see MAXI Tracy today for evaluation.                     Reason for Disposition   MILD vaginal bleeding (i.e., less than 1 pad / hour; less than patient's usual menstrual bleeding; not just spotting)    Protocols used: Pregnancy - Vaginal Bleeding Less Than 20 Weeks EGA-A-[place]

## 2024-10-30 ENCOUNTER — TELEPHONE (OUTPATIENT)
Age: 24
End: 2024-10-30

## 2024-10-30 NOTE — TELEPHONE ENCOUNTER
Hello,  Sorry I missed you - I am reaching out from the [facility] Navigation department, following up on an order from your provider's office to assist in connecting you with resources for care. If you would like to discuss this further, please give us a call back at [phone], or for more immediate assistance you can contact our 24-hour help line at [phone]. We look forward to hearing from you soon.

## 2024-11-12 ENCOUNTER — TELEPHONE (OUTPATIENT)
Age: 24
End: 2024-11-12

## 2024-11-12 NOTE — TELEPHONE ENCOUNTER
Hello,     The [facility] Navigation team has attempted to reach you regarding an order from Rossana Dover's office. We are reaching out in order to assist you in coordinating care and resources that may meet your needs. Please give our office a call at [phone]. For more immediate assistance you can contact our 24-hour help line at [phone]. We look forward to hearing from you soon.

## 2024-12-23 ENCOUNTER — LAB ENCOUNTER (OUTPATIENT)
Dept: LAB | Facility: HOSPITAL | Age: 24
End: 2024-12-23
Attending: NURSE PRACTITIONER
Payer: COMMERCIAL

## 2024-12-23 ENCOUNTER — OFFICE VISIT (OUTPATIENT)
Dept: FAMILY MEDICINE CLINIC | Facility: CLINIC | Age: 24
End: 2024-12-23
Payer: COMMERCIAL

## 2024-12-23 VITALS
TEMPERATURE: 99 F | WEIGHT: 190 LBS | HEIGHT: 68 IN | SYSTOLIC BLOOD PRESSURE: 112 MMHG | HEART RATE: 69 BPM | BODY MASS INDEX: 28.79 KG/M2 | DIASTOLIC BLOOD PRESSURE: 62 MMHG

## 2024-12-23 DIAGNOSIS — Z32.01 POSITIVE PREGNANCY TEST (HCC): Primary | ICD-10-CM

## 2024-12-23 DIAGNOSIS — N92.6 MISSED MENSES: ICD-10-CM

## 2024-12-23 DIAGNOSIS — Z32.01 POSITIVE PREGNANCY TEST (HCC): ICD-10-CM

## 2024-12-23 DIAGNOSIS — F41.1 GAD (GENERALIZED ANXIETY DISORDER): ICD-10-CM

## 2024-12-23 DIAGNOSIS — F33.2 MAJOR DEPRESSIVE DISORDER, RECURRENT SEVERE WITHOUT PSYCHOTIC FEATURES (HCC): ICD-10-CM

## 2024-12-23 LAB
B-HCG SERPL-ACNC: <2.6 MIU/ML
CONTROL LINE PRESENT WITH A CLEAR BACKGROUND (YES/NO): YES YES/NO
KIT LOT #: NORMAL NUMERIC
PREGNANCY TEST, URINE: NEGATIVE

## 2024-12-23 PROCEDURE — 84702 CHORIONIC GONADOTROPIN TEST: CPT | Performed by: NURSE PRACTITIONER

## 2024-12-23 NOTE — PROGRESS NOTES
Chief Complaint:   Chief Complaint   Patient presents with    Pregnancy     First time positive pregnancy test 12/16       HPI:   This is a 24 year old female coming in for positive home pregnancy test on 12/16. LMP 11/18, had light bleeding/spotting 12/14-12/15 and then it stopped. She tested multiple times at home and all were positive. See picture from patient message earlier this week. No further bleeding/spotting. Denies pelvic pain/cramping. Reports some mild nausea earlier last week. Had a migraine last week and vomited x 1. Reports fatigue, mood swings. Had some mild breast tenderness but that has resolved. Taking a prenatal vitamin. Taking sertraline, not consistently. Has not been using hydroxyzine or trazodone.     Results for orders placed or performed in visit on 10/28/24   Urine Culture, Routine [E]    Collection Time: 10/28/24 11:41 AM    Specimen: Urine, clean catch   Result Value Ref Range    Urine Culture No Growth at 18-24 hrs.    HCG, Beta Subunit, Quant [E]    Collection Time: 10/28/24 11:44 AM   Result Value Ref Range    Hcg Quantitative <2.6 <=4.2 mIU/mL             Past Medical History:    Anxiety    Depression     History reviewed. No pertinent surgical history.  Social History:  Social History     Socioeconomic History    Marital status: Single   Tobacco Use    Smoking status: Never     Passive exposure: Never    Smokeless tobacco: Never   Vaping Use    Vaping status: Former    Substances: Nicotine, THC    Devices: Disposable   Substance and Sexual Activity    Alcohol use: Not Currently     Alcohol/week: 2.0 standard drinks of alcohol     Types: 2 Standard drinks or equivalent per week     Comment: social drinker. Last drink was 2- had 2 mixed drinks/    Drug use: Yes     Types: Cannabis   Other Topics Concern    Caffeine Concern No    Exercise No    Seat Belt No    Special Diet No    Stress Concern No    Weight Concern No     Social Drivers of Health     Financial Resource Strain:  Low Risk  (2/16/2024)    Received from [facility], [facility]    Financial Resource Strain     In the past year, have you or any family members you live with been unable to get any of the following when it was really needed? Check all that apply.: None   Food Insecurity: Low Risk  (2/16/2024)    [facility]    Food Insecurity     Within the past 12 months, you worried that your food would run out before you got money to buy more.  : Never true     Within the past 12 months, the food you bought just didn't last and you didn't have money to get more. : Never true   Transportation Needs: Not At Risk (2/16/2024)    [facility]    Transportation Needs     In the past 12 months, has lack of reliable transportation kept you from medical appointments, meetings, work or from getting things needed for daily living? : No   Social Connections: Low Risk  (2/16/2024)    Received from [facility], [facility]    Social Connections     How often do you see or talk to people that you care about and feel close to? (For example: talking to friends on the phone, visiting friends or family, going to Religion or club meetings): 5 or more times a week     Family History:  Family History   Problem Relation Age of Onset    Bipolar Disorder Son      Allergies:  Allergies[1]  Current Meds:  Current Outpatient Medications   Medication Sig Dispense Refill    sertraline 50 MG Oral Tab Take 1/2 tablet daily x 1 week, then 1 tablet daily 90 tablet 0    hydrOXYzine 10 MG Oral Tab Take 2.5 tablets (25 mg total) by mouth.      traZODone 100 MG Oral Tab Take 1 tablet (100 mg total) by mouth nightly as needed for Sleep (sleep/insomnia). 30 tablet 0      Counseling given: Not Answered       REVIEW OF SYSTEMS:   CONSTITUTIONAL:  Denies unusual weight gain/loss, fever, chills. +Fatigue.  CARDIOVASCULAR:  Denies  chest pain, palpitations, edema, dyspnea on exertion or at rest.  RESPIRATORY:  Denies shortness of breath, wheezing, cough or sputum.  GASTROINTESTINAL:  See HPI. No pelvic/abdominal pain/cramping now.  GENITOURINARY: Denies dysuria, hematuria, frequency. Denies bleeding/spotting since 12/15.   NEUROLOGICAL:  Denies seizures, dizziness. +Headache last weke.  PSYCHIATRIC:  Hx anxiety/depression. Denies suicidal thoughts.    EXAM:   /62 (BP Location: Right arm, Patient Position: Sitting, Cuff Size: adult)   Pulse 69   Temp 98.5 °F (36.9 °C) (Oral)   Ht 5' 8\" (1.727 m)   Wt 190 lb (86.2 kg)   LMP 11/18/2024 (Exact Date)   BMI 28.89 kg/m²  Estimated body mass index is 28.89 kg/m² as calculated from the following:    Height as of this encounter: 5' 8\" (1.727 m).    Weight as of this encounter: 190 lb (86.2 kg).   Vital signs reviewed.Appears stated age, well groomed, in no acute distress.  Physical Exam:  GEN:  Patient is alert, awake and oriented, well developed, well nourished.  SKIN: No rashes, no skin lesion, no bruising, good turgor.  HEART:  Regular rate and rhythm, no murmurs, rubs or gallops.  LUNGS: Clear to auscultation bilterally, no rales/rhonchi/wheezing.  ABDOMEN:  Soft, nondistended, nontender, bowel sounds normal in all 4 quadrants, no masses, no hepatosplenomegaly.  NEURO:  No deficit, normal gait, strength and tone, sensory intact.    ASSESSMENT AND PLAN:   1. Positive pregnancy test (HCC)  -Positive at home, negative x 2 in office.   -Will check hCG on blood and pelvic US.  -Previously referred to OB, recommended follow-up.  -Continue prenatal vitamins. Discussion of do's and don'ts for first trimester pregnancy reviewed with patient.  -To ER with severe abdominal/pelvic pain, heavy bleeding.   - HCG, Beta Subunit (Quant Pregnancy Test); Future  - US PREG 1ST TRIM W/DOP (CPT=76801/91681); Future    2. Missed menses  -See above.  - HCG, Beta Subunit (Quant Pregnancy Test); Future  - US PREG  1ST TRIM W/DOP (CPT=76801/88624); Future    3. KARMA (generalized anxiety disorder)  -Recommended to take sertraline consistently.     4. Major depressive disorder, recurrent severe without psychotic features (HCC)  -See above.      Meds & Refills for this Visit:  Requested Prescriptions      No prescriptions requested or ordered in this encounter         Problem List:  Patient Active Problem List   Diagnosis    Major depressive disorder, recurrent severe without psychotic features (HCC)    KARMA (generalized anxiety disorder)    PTSD (post-traumatic stress disorder)    Cannabis use disorder       Rossana Dover, APRN  12/23/2024  4:22 PM         [1] No Known Allergies

## 2025-01-20 ENCOUNTER — NURSE TRIAGE (OUTPATIENT)
Dept: FAMILY MEDICINE CLINIC | Facility: CLINIC | Age: 25
End: 2025-01-20

## 2025-01-20 ENCOUNTER — HOSPITAL ENCOUNTER (OUTPATIENT)
Age: 25
Discharge: HOME | End: 2025-01-20
Payer: COMMERCIAL

## 2025-01-20 VITALS
SYSTOLIC BLOOD PRESSURE: 138 MMHG | DIASTOLIC BLOOD PRESSURE: 91 MMHG | OXYGEN SATURATION: 100 % | HEART RATE: 89 BPM | RESPIRATION RATE: 18 BRPM | TEMPERATURE: 98 F

## 2025-01-20 DIAGNOSIS — N30.90 CYSTITIS: Primary | ICD-10-CM

## 2025-01-20 LAB
#MXD IC: 0.4 X10ˆ3/UL (ref 0.1–1)
B-HCG UR QL: NEGATIVE
BILIRUB UR QL STRIP: NEGATIVE
BUN BLD-MCNC: 5 MG/DL (ref 7–18)
CHLORIDE BLD-SCNC: 103 MMOL/L (ref 98–112)
CLARITY UR: CLEAR
CO2 BLD-SCNC: 24 MMOL/L (ref 21–32)
COLOR UR: YELLOW
CREAT BLD-MCNC: 0.8 MG/DL
EGFRCR SERPLBLD CKD-EPI 2021: 105 ML/MIN/1.73M2 (ref 60–?)
GLUCOSE BLD-MCNC: 95 MG/DL (ref 70–99)
GLUCOSE UR STRIP-MCNC: NEGATIVE MG/DL
HCT VFR BLD AUTO: 34.5 %
HCT VFR BLD CALC: 39 %
HGB BLD-MCNC: 11 G/DL
HGB UR QL STRIP: NEGATIVE
ISTAT IONIZED CALCIUM FOR CHEM 8: 1.2 MMOL/L (ref 1.12–1.32)
KETONES UR STRIP-MCNC: 40 MG/DL
LYMPHOCYTES # BLD AUTO: 2.7 X10ˆ3/UL (ref 1–4)
LYMPHOCYTES NFR BLD AUTO: 54.6 %
MCH RBC QN AUTO: 28.6 PG (ref 26–34)
MCHC RBC AUTO-ENTMCNC: 31.9 G/DL (ref 31–37)
MCV RBC AUTO: 89.8 FL (ref 80–100)
MIXED CELL %: 9 %
NEUTROPHILS # BLD AUTO: 1.8 X10ˆ3/UL (ref 1.5–7.7)
NEUTROPHILS NFR BLD AUTO: 36.4 %
NITRITE UR QL STRIP: NEGATIVE
PH UR STRIP: 6.5 [PH]
PLATELET # BLD AUTO: 267 X10ˆ3/UL (ref 150–450)
POTASSIUM BLD-SCNC: 3.7 MMOL/L (ref 3.6–5.1)
PROT UR STRIP-MCNC: NEGATIVE MG/DL
RBC # BLD AUTO: 3.84 X10ˆ6/UL
SODIUM BLD-SCNC: 140 MMOL/L (ref 136–145)
SP GR UR STRIP: 1.02
UROBILINOGEN UR STRIP-ACNC: <2 MG/DL
WBC # BLD AUTO: 4.9 X10ˆ3/UL (ref 4–11)

## 2025-01-20 PROCEDURE — 81025 URINE PREGNANCY TEST: CPT | Performed by: NURSE PRACTITIONER

## 2025-01-20 PROCEDURE — 99214 OFFICE O/P EST MOD 30 MIN: CPT | Performed by: NURSE PRACTITIONER

## 2025-01-20 PROCEDURE — 81002 URINALYSIS NONAUTO W/O SCOPE: CPT | Performed by: NURSE PRACTITIONER

## 2025-01-20 PROCEDURE — 85025 COMPLETE CBC W/AUTO DIFF WBC: CPT | Performed by: NURSE PRACTITIONER

## 2025-01-20 PROCEDURE — 87086 URINE CULTURE/COLONY COUNT: CPT | Performed by: NURSE PRACTITIONER

## 2025-01-20 PROCEDURE — 80047 BASIC METABLC PNL IONIZED CA: CPT | Performed by: NURSE PRACTITIONER

## 2025-01-20 RX ORDER — CEFADROXIL 500 MG/1
500 CAPSULE ORAL 2 TIMES DAILY
Qty: 14 CAPSULE | Refills: 0 | Status: SHIPPED | OUTPATIENT
Start: 2025-01-20 | End: 2025-01-27

## 2025-01-20 NOTE — ED PROVIDER NOTES
Patient Seen in: [facility]      History     No chief complaint on file.    Stated Complaint: Urinary issue/back pain  Subjective:   HPI    This is a 24-year-old female with a history of anxiety, depression who presents with right low back pain for the last few weeks.  States she works out and thought maybe she pulled a muscle so never was evaluated.  Now with persistent pain with trauma to the visit today.  Pain is not worse than what it was before.  Also has had some urinary urgency.  Denies any dysuria, no hematuria.  No nausea or vomiting.  Reports intermittent headaches, has had some twitching in her legs she states.  She states last time that happened she reports her potassium was low.  Was on potassium supplements for 10 days but was taken off after her potassium was normal.      Objective:   Past Medical History:    Anxiety    Depression            History reviewed. No pertinent surgical history.           Social History     Socioeconomic History    Marital status: Single   Tobacco Use    Smoking status: Every Day     Passive exposure: Never    Smokeless tobacco: Never   Vaping Use    Vaping status: Every Day    Substances: Nicotine, THC    Devices: Disposable   Substance and Sexual Activity    Alcohol use: Not Currently     Alcohol/week: 2.0 standard drinks of alcohol     Types: 2 Standard drinks or equivalent per week     Comment: social drinker. Last drink was 2- had 2 mixed drinks/    Drug use: Yes     Types: Cannabis   Other Topics Concern    Caffeine Concern No    Exercise No    Seat Belt No    Special Diet No    Stress Concern No    Weight Concern No     Social Drivers of Health     Financial Resource Strain: Low Risk  (2/16/2024)    Received from [facility], [facility]    Financial Resource Strain     In the past year, have you or any family members you live with been unable to get any of the following when it was really needed? Check all that apply.: None    Food Insecurity: Low Risk  (2/16/2024)    [facility]    Food Insecurity     Within the past 12 months, you worried that your food would run out before you got money to buy more.  : Never true     Within the past 12 months, the food you bought just didn't last and you didn't have money to get more. : Never true   Transportation Needs: Not At Risk (2/16/2024)    [facility]    Transportation Needs     In the past 12 months, has lack of reliable transportation kept you from medical appointments, meetings, work or from getting things needed for daily living? : No   Social Connections: Low Risk  (2/16/2024)    [facility]    Social Connections     How often do you see or talk to people that you care about and feel close to? (For example: talking to friends on the phone, visiting friends or family, going to Orthodox or club meetings): 5 or more times a week            Review of Systems   All other systems reviewed and are negative.      Positive for stated complaint: No chief complaint on file.    Other systems are as noted in HPI.  Constitutional and vital signs reviewed.      All other systems reviewed and negative except as noted above.    Physical Exam     ED Triage Vitals [01/20/25 1604]   BP (!) 138/91   Pulse 89   Resp 18   Temp 98.4 °F (36.9 °C)   Temp src Oral   SpO2 100 %   O2 Device None (Room air)     Current:BP (!) 138/91   Pulse 89   Temp 98.4 °F (36.9 °C) (Oral)   Resp 18   LMP 01/08/2025 (Exact Date)   SpO2 100%     Physical Exam  Vitals and nursing note reviewed.   Constitutional:       General: She is awake. She is not in acute distress.     Appearance: Normal appearance. She is not ill-appearing, toxic-appearing or diaphoretic.   HENT:      Head: Normocephalic and atraumatic.      Right Ear: Tympanic membrane, ear canal and external ear normal.       Left Ear: Tympanic membrane, ear canal and external ear normal.      Nose: Nose normal.      Mouth/Throat:      Mouth: Mucous membranes are moist.      Pharynx: Oropharynx is clear. Uvula midline.   Eyes:      General: Lids are normal.      Extraocular Movements: Extraocular movements intact.      Conjunctiva/sclera: Conjunctivae normal.      Pupils: Pupils are equal, round, and reactive to light.   Cardiovascular:      Rate and Rhythm: Normal rate and regular rhythm.      Pulses: Normal pulses.      Heart sounds: Normal heart sounds.   Pulmonary:      Effort: Pulmonary effort is normal.      Breath sounds: Normal breath sounds and air entry. No stridor, decreased air movement or transmitted upper airway sounds.   Abdominal:      Palpations: Abdomen is soft.      Tenderness: There is no abdominal tenderness. There is no right CVA tenderness or left CVA tenderness.   Skin:     General: Skin is warm and dry.      Capillary Refill: Capillary refill takes less than 2 seconds.   Neurological:      General: No focal deficit present.      Mental Status: She is alert and oriented to person, place, and time.   Psychiatric:         Mood and Affect: Mood normal.         Behavior: Behavior normal. Behavior is cooperative.         Thought Content: Thought content normal.         Judgment: Judgment normal.         ED Course   CBC, i-STAT, urinalysis, urine pregnancy and reevaluate   CBC reviewed, hemoglobin 11.0 hematocrit 34.5.  Chemistry reviewed, unremarkable.  Urinalysis reviewed, trace leukocyte esterase, 40 ketones.  Pregnancy negative.    No results found.  Labs Reviewed   POCT CBC - Abnormal; Notable for the following components:       Result Value    HGB IC 11.0 (*)     HCT IC 34.5 (*)     All other components within normal limits   [facility] POCT URINALYSIS DIPSTICK - Abnormal; Notable for the following components:    Ketone, Urine 40 (*)     Leukocyte esterase urine Trace (*)     All other components within normal limits    POCT ISTAT CHEM8 CARTRIDGE - Abnormal; Notable for the following components:    ISTAT BUN 5 (*)     All other components within normal limits   POCT PREGNANCY URINE - Normal   URINE CULTURE, ROUTINE       MDM     Medical Decision Making  Differential diagnoses reflecting the complexity of care include but are not limited to electrolyte imbalance, cystitis, pyelonephritis.    Comorbidities that add complexity to management include: n/a  History obtained by an independent source was from: n/a  Discussions of management was done with: Dr. Gaytan  My independent interpretations of studies include: CBC, chemistry reviewed, unremarkable.  Hemoglobin 11.0.  Her previous hemoglobin was slightly lower.  Shared decision making was done by: Patient, Dr. Gaytan and myself  Patient is well appearing, non-toxic and in no acute distress.  Vital signs are stable.   Discussed with the patient her laboratory findings.  Will treat for cystitis at this time with Duricef which she has tolerated in the past.  We also discussed close follow-up with her PCP.  Urine does demonstrate ketones so I did discuss with her I would continue to push fluids and make sure she is staying hydrated.  No CVA tenderness on exam, no evidence of pyelonephritis.  Labs here are reassuring.  Patient verbalized plan of care and states understanding.    Problems Addressed:  Cystitis: acute illness or injury    Amount and/or Complexity of Data Reviewed  Labs: ordered. Decision-making details documented in ED Course.  ECG/medicine tests: ordered and independent interpretation performed. Decision-making details documented in ED Course.    Risk  OTC drugs.  Prescription drug management.        Disposition and Plan     Clinical Impression:  1. Cystitis         Disposition:  Discharge  1/20/2025  4:47 pm    Follow-up:  Benjamin Light MD  [address] [place] [id number]  [phone]                Medications Prescribed:  Discharge Medication List as of  1/20/2025  4:52 PM        START taking these medications    Details   cefadroxil 500 MG Oral Cap Take 1 capsule (500 mg total) by mouth 2 (two) times daily for 7 days., Normal, Disp-14 capsule, R-0                Note to patient: The [employer] act makes medical notes like these available to patients in the interest of transparency.  However, be advised this medical document and is intended as peer to peer communication.  It is read the medical language and may contain abbreviations or verbiage that are unfamiliar.  It may appear blunt or direct.  Medical documents are intended to carry relevant information, fax is evident and the clinical opinion of the practitioner.    This note was prepared using Dragon Medical voice recognition dictation software.  As a result, errors may occur.  When identified, these errors have been corrected.  While every attempt is made to correct errors during dictation, discrepancies may still exist.    Mckenna Sims, APRN  1/20/2025  4:13 PM

## 2025-01-20 NOTE — TELEPHONE ENCOUNTER
RN spoke with patient.     Patient says that \"she does not feel terrible but she feels off\"     Patient has a history of low potassium. Patient was seen in [facility]  8/14/24 and was given a potassium supplement.     Patient is having back pain below her \"love handle\" area on the right side of her back- has been present for three weeks. A few days ago, pain started to shift to the left. Patient rates pain currently 4-5/10.    Patient has also noticed frequent urination for the last few weeks. Patient had a day where she urinated six times. No burning with urination. Every time patient urinates, she notices bubbles. Patient has a history of protein in the urination.     Patient has also noticed more muscle twitches lately. Usually happens in leg and arm. Patient says that this has been consistent. Previously symptoms were improved with potassium supplements.    Patient says that she \"had a clicking feeling on her left side, says providers have evaluated this before and said that it was her rib cage.     Patient denies chest pain or heart palpitations at this time.    Patient also feels that she has been getting more migraines than usual. First migraine was three weeks ago. Patient had been vomiting because of that migraine. Pt was seen by MAXI Tracy around this time for potential pregnancy. Patient's testing was negative- patient is not pregnant.    Patient says she passed out Sunday 1/12/25  from taking an \" marijuana edible\" last week. Patient took the whole thing and did not realize she was not supposed to take the whole thing. Patient fell, did not hit her head, hit her elbow- does not know if she fainted more than once. No fainting has occurred since that day. Patient feels ok now.    Another migraine happened Friday/Saturday- no vomiting with this episode.     Patient advised to proceed to the emergency room/immediate care center for an assessment and evaluation of her symptoms.     Patient is agreeable to this  plan and voices understanding.               Reason for Disposition   Patient sounds very sick or weak to the triager    Protocols used: [id number]

## 2025-01-20 NOTE — DISCHARGE INSTRUCTIONS
Please start antibiotics and take as prescribed.  Close follow-up with your primary care provider is recommended.  Any worsening symptoms please immediately go to the emergency department.

## 2025-01-20 NOTE — ED INITIAL ASSESSMENT (HPI)
3 weeks ago, pt began having right lower back pain . A few days ago, lower back pain migrated to left side. Pt reports urinary frequency that also started weeks ago. +intermittent headaches and noticing twitching in legs. Denies hematuria and malodorous urine.

## 2025-02-06 ENCOUNTER — LAB ENCOUNTER (OUTPATIENT)
Dept: LAB | Age: 25
End: 2025-02-06
Attending: NURSE PRACTITIONER
Payer: COMMERCIAL

## 2025-02-06 ENCOUNTER — PATIENT MESSAGE (OUTPATIENT)
Dept: FAMILY MEDICINE CLINIC | Facility: CLINIC | Age: 25
End: 2025-02-06

## 2025-02-06 DIAGNOSIS — Z32.01 POSITIVE PREGNANCY TEST (HCC): ICD-10-CM

## 2025-02-06 DIAGNOSIS — Z32.01 POSITIVE PREGNANCY TEST (HCC): Primary | ICD-10-CM

## 2025-02-06 LAB — B-HCG SERPL-ACNC: 937.4 MIU/ML

## 2025-02-06 PROCEDURE — 36415 COLL VENOUS BLD VENIPUNCTURE: CPT

## 2025-02-06 PROCEDURE — 84702 CHORIONIC GONADOTROPIN TEST: CPT

## 2025-02-06 NOTE — TELEPHONE ENCOUNTER
I placed a lab order for a blood hcg level. She can go get that done this week/weekend. I agree with scheduling OB follow-up. She can see me next week if needed.

## 2025-08-03 ENCOUNTER — HOSPITAL ENCOUNTER (OUTPATIENT)
Age: 25
Discharge: HOME | End: 2025-08-03
Attending: SPECIALIST | Admitting: SPECIALIST

## 2025-08-03 VITALS
DIASTOLIC BLOOD PRESSURE: 78 MMHG | TEMPERATURE: 98.6 F | HEART RATE: 101 BPM | WEIGHT: 220 LBS | SYSTOLIC BLOOD PRESSURE: 120 MMHG | BODY MASS INDEX: 33.34 KG/M2 | HEIGHT: 68 IN | RESPIRATION RATE: 17 BRPM

## 2025-08-03 LAB — A1 MICROGLOB PLACENTAL VAG QL: NEGATIVE

## 2025-08-03 PROCEDURE — 84112 EVAL AMNIOTIC FLUID PROTEIN: CPT | Performed by: SPECIALIST

## 2025-08-03 PROCEDURE — 99202 OFFICE O/P NEW SF 15 MIN: CPT

## 2025-08-03 RX ORDER — 0.9 % SODIUM CHLORIDE 0.9 %
10 VIAL (ML) INJECTION PRN
Status: DISCONTINUED | OUTPATIENT
Start: 2025-08-03 | End: 2025-08-03 | Stop reason: HOSPADM

## 2025-08-03 RX ORDER — FERROUS SULFATE 325(65) MG
325 TABLET ORAL EVERY OTHER DAY
COMMUNITY

## 2025-08-03 RX ORDER — VITAMIN A ACETATE, BETA CAROTENE, ASCORBIC ACID, CHOLECALCIFEROL, .ALPHA.-TOCOPHEROL ACETATE, DL-, THIAMINE MONONITRATE, RIBOFLAVIN, NIACINAMIDE, PYRIDOXINE HYDROCHLORIDE, FOLIC ACID, CYANOCOBALAMIN, CALCIUM CARBONATE, FERROUS FUMARATE, ZINC OXIDE, CUPRIC OXIDE 3080; 12; 120; 400; 1; 1.84; 3; 20; 22; 920; 25; 200; 27; 10; 2 [IU]/1; UG/1; MG/1; [IU]/1; MG/1; MG/1; MG/1; MG/1; MG/1; [IU]/1; MG/1; MG/1; MG/1; MG/1; MG/1
1 TABLET, FILM COATED ORAL DAILY
COMMUNITY

## 2025-08-03 RX ORDER — 0.9 % SODIUM CHLORIDE 0.9 %
2 VIAL (ML) INJECTION EVERY 12 HOURS SCHEDULED
Status: DISCONTINUED | OUTPATIENT
Start: 2025-08-03 | End: 2025-08-03 | Stop reason: HOSPADM

## 2025-08-03 ASSESSMENT — PAIN SCALES - GENERAL
PAINLEVEL: 0 - NO PAIN
PAINLEVEL_OUTOF10: 0